# Patient Record
Sex: MALE | Race: WHITE | NOT HISPANIC OR LATINO | Employment: OTHER | URBAN - METROPOLITAN AREA
[De-identification: names, ages, dates, MRNs, and addresses within clinical notes are randomized per-mention and may not be internally consistent; named-entity substitution may affect disease eponyms.]

---

## 2019-01-22 ENCOUNTER — HOSPITAL ENCOUNTER (OUTPATIENT)
Dept: PREADMISSION TESTING | Facility: HOSPITAL | Age: 64
Discharge: HOME | End: 2019-01-22
Attending: SURGERY | Admitting: SURGERY

## 2019-01-22 LAB
ANION GAP SERPL CALC-SCNC: 12.8 MMOL/L (ref 10–20)
BACTERIA SPEC AEROBE CULT: NORMAL
BASOPHILS # BLD AUTO: 0.1 10*3/UL (ref 0–0.2)
BASOPHILS NFR BLD AUTO: 1 % (ref 0–2)
BUN SERPL-MCNC: 11 MG/DL (ref 8–20)
BUN/CREAT SERPL: 11 (ref 6.2–20.3)
CALCIUM SERPL-MCNC: 9 MG/DL (ref 8.9–10.3)
CHLORIDE SERPL-SCNC: 103 MMOL/L (ref 101–111)
CONV CO2: 22 MMOL/L (ref 22–32)
CREAT UR-MCNC: 1 MG/DL (ref 0.7–1.2)
DIFFERENTIAL METHOD BLD: (no result)
EOSINOPHIL # BLD AUTO: 0.1 10*3/UL (ref 0–0.3)
EOSINOPHIL # BLD AUTO: 1 % (ref 0–3)
ERYTHROCYTE [DISTWIDTH] IN BLOOD BY AUTOMATED COUNT: 13.9 % (ref 11.5–14.5)
GLUCOSE SERPL-MCNC: 116 MG/DL (ref 65–99)
HCT VFR BLD AUTO: 45.5 % (ref 40–54)
HGB BLD-MCNC: 15.3 G/DL (ref 14–18)
LYMPHOCYTES # BLD AUTO: 1.9 10*3/UL (ref 0.8–4.8)
LYMPHOCYTES NFR BLD AUTO: 23 % (ref 18–42)
Lab: NORMAL
MCH RBC QN AUTO: 31.3 PG (ref 26–32)
MCHC RBC AUTO-ENTMCNC: 33.7 G/DL (ref 32–36)
MCV RBC AUTO: 92.9 FL (ref 80–94)
MICRO REPORT STATUS: NORMAL
MONOCYTES # BLD AUTO: 0.5 10*3/UL (ref 0.1–1.3)
MONOCYTES NFR BLD AUTO: 7 % (ref 2–11)
NEUTROPHILS # BLD AUTO: 5.6 10*3/UL (ref 2.3–8.6)
NEUTROPHILS NFR BLD AUTO: 68 % (ref 50–75)
NRBC BLD AUTO-RTO: 0 /100{WBCS}
NRBC/RBC NFR BLD MANUAL: 0 10*3/UL
PLATELET # BLD AUTO: 341 10*3/UL (ref 150–450)
PMV BLD AUTO: 8 FL (ref 7.4–10.4)
POTASSIUM SERPL-SCNC: 3.8 MMOL/L (ref 3.6–5.1)
RBC # BLD AUTO: 4.9 10*6/UL (ref 4.6–6)
SODIUM SERPL-SCNC: 134 MMOL/L (ref 136–144)
SPECIMEN SOURCE: NORMAL
WBC # BLD AUTO: 8.1 10*3/UL (ref 4.5–11.5)

## 2019-01-30 ENCOUNTER — HOSPITAL ENCOUNTER (OUTPATIENT)
Dept: PREOP | Facility: HOSPITAL | Age: 64
Setting detail: HOSPITAL OUTPATIENT SURGERY
Discharge: HOME | End: 2019-01-30
Attending: SURGERY | Admitting: SURGERY

## 2022-11-29 ENCOUNTER — OFFICE VISIT (OUTPATIENT)
Dept: FAMILY MEDICINE CLINIC | Facility: CLINIC | Age: 67
End: 2022-11-29

## 2022-11-29 VITALS
RESPIRATION RATE: 16 BRPM | DIASTOLIC BLOOD PRESSURE: 60 MMHG | OXYGEN SATURATION: 97 % | HEART RATE: 73 BPM | BODY MASS INDEX: 22.35 KG/M2 | HEIGHT: 72 IN | WEIGHT: 165 LBS | TEMPERATURE: 97.9 F | SYSTOLIC BLOOD PRESSURE: 124 MMHG

## 2022-11-29 DIAGNOSIS — Z13.220 SCREENING CHOLESTEROL LEVEL: ICD-10-CM

## 2022-11-29 DIAGNOSIS — Z00.00 ANNUAL PHYSICAL EXAM: Primary | ICD-10-CM

## 2022-11-29 DIAGNOSIS — Z13.1 DIABETES MELLITUS SCREENING: ICD-10-CM

## 2022-11-29 DIAGNOSIS — F17.219 CIGARETTE NICOTINE DEPENDENCE WITH NICOTINE-INDUCED DISORDER: ICD-10-CM

## 2022-11-29 DIAGNOSIS — R63.6 UNDERWEIGHT: ICD-10-CM

## 2022-11-29 DIAGNOSIS — R53.83 OTHER FATIGUE: ICD-10-CM

## 2022-11-29 DIAGNOSIS — Z71.6 ENCOUNTER FOR SMOKING CESSATION COUNSELING: ICD-10-CM

## 2022-11-29 DIAGNOSIS — R79.9 ABNORMAL FINDING OF BLOOD CHEMISTRY, UNSPECIFIED: ICD-10-CM

## 2022-11-29 DIAGNOSIS — Z11.59 ENCOUNTER FOR HEPATITIS C SCREENING TEST FOR LOW RISK PATIENT: ICD-10-CM

## 2022-11-29 PROBLEM — K40.30 INCARCERATED INGUINAL HERNIA: Status: ACTIVE | Noted: 2019-01-10

## 2022-11-29 PROBLEM — K40.30 INCARCERATED INGUINAL HERNIA: Status: RESOLVED | Noted: 2019-01-10 | Resolved: 2022-11-29

## 2022-11-29 PROCEDURE — 99387 INIT PM E/M NEW PAT 65+ YRS: CPT | Performed by: STUDENT IN AN ORGANIZED HEALTH CARE EDUCATION/TRAINING PROGRAM

## 2022-11-29 PROCEDURE — 99203 OFFICE O/P NEW LOW 30 MIN: CPT | Performed by: STUDENT IN AN ORGANIZED HEALTH CARE EDUCATION/TRAINING PROGRAM

## 2022-11-29 RX ORDER — IBUPROFEN 800 MG/1
TABLET ORAL
COMMUNITY
Start: 2022-09-07 | End: 2023-03-02

## 2022-11-29 RX ORDER — HYDROCODONE BITARTRATE AND ACETAMINOPHEN 5; 325 MG/1; MG/1
TABLET ORAL
COMMUNITY
Start: 2022-11-04 | End: 2022-11-29

## 2022-11-29 RX ORDER — AMOXICILLIN 500 MG/1
TABLET, FILM COATED ORAL
COMMUNITY
Start: 2022-11-04 | End: 2022-11-29

## 2022-11-29 NOTE — PROGRESS NOTES
Chief Complaint   Patient presents with   • Establish Care     Pt transferring from Dr. Casey Zamarripa   • Annual Exam     Subjective   Hamlet Ortiz is a 67 y.o. male here for his annual physical with me.   Hamlet is here for coordination of medical care, to discuss health maintenance, disease prevention as well as to followup on medical problems.     Annual Physical Exam  Patient's last PE was 15 years ago.   -Patient's medical, surgical, family, substance, sexual, socioeconomic histories were reviewed and updated.  Patient's allergy and medication lists were reviewed and updated  Activity level is moderate.   Exercises 7 per week.  Walking daily  Appetite is good.   Feels well with few complaints.   Energy level is good.   Sleeps fairly well.   Patient's last colonoscopy was 2010. He is advised to repeat in 10 years.   Patient is doing routine self skin exam occasionally. Patient is doing routine testicle exams monthly.    Squamous Cell Carcinoma  -Patient had all of his teeth removed by dentist (was on amoxicillin, ibuprofen and hydrocodone for this.  He is no longer taking these medications).  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. Friday he has a consultation at [facility] in [place]  - Dr Salazar (ENT)  thinks they may be able to surgically remove the cancer alone    Smoking Cessation  -Patient has been smoking 1 pack a day since about 1992 (30 years) and he also did tobacco chew and quit in the 1980s  -Since the above diagnosis, patient has been cutting back a lot but is having trouble completely stopping  -Nicotine lozenges help but it takes about 2 to 3 hours to fully kick in  -Cravings get worse with anxiety (has never had to deal with anxiety  before)  -Would like to address via nicotine patches for now and revisit anxiety next time    Hamlet Ortiz  reports that he has been smoking cigarettes. He started smoking about 30 years ago. He has a 30.00 pack-year smoking history. He quit smokeless tobacco use about 42 years ago.  His smokeless tobacco use included chew.. I have educated him on the risk of diseases from using tobacco products such as cancer, COPD and heart disease.     I advised him to quit and he is willing to quit. We have discussed the following method/s for tobacco cessation:  OTC Cessation Products.  Together we have set a quit date for 1-2 months.  He will follow up with me in 1 month or sooner to check on his progress.    I spent 6 minutes counseling the patient.           Preventative  - has a FOBT kit but hasn't sent it off yet. Last colonoscopy was about 12 to 13 years ago  -Has had 5 COVID vaccinations but does not have his card on him today      The following portions of the patient's history were reviewed and updated as appropriate: allergies, current medications, past family history, past medical history, past social history, past surgical history and problem list.      Past Medical History :  Active Ambulatory Problems     Diagnosis Date Noted   • Underweight 11/29/2022   • Cigarette nicotine dependence with nicotine-induced disorder 11/29/2022     Resolved Ambulatory Problems     Diagnosis Date Noted   • Incarcerated inguinal hernia 01/10/2019     No Additional Past Medical History       Medication List:    Current Outpatient Medications:   •  ibuprofen (ADVIL,MOTRIN) 800 MG tablet, , Disp: , Rfl:   •  nicotine (NICODERM CQ) 7 MG/24HR patch, Place 1 patch on the skin as directed by provider Daily., Disp: 30 patch, Rfl: 2    Allergies:  No Known Allergies    Social History:  Social History     Socioeconomic History   • Marital status:    Tobacco Use   • Smoking status: Every Day     Packs/day: 1.00     Years: 30.00      "Pack years: 30.00     Types: Cigarettes     Start date: 1992   • Smokeless tobacco: Former     Types: Chew     Quit date: 1980   • Tobacco comments:     Smoking 3 cigarettes a day (11/29/22)   Vaping Use   • Vaping Use: Never used   Substance and Sexual Activity   • Alcohol use: Not Currently     Comment: recovering alcoholic. 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 (11/2022)   • Drug use: Never   • Sexual activity: Not Currently     Comment:        Family History:  Family History   Problem Relation Age of Onset   • Heart failure Mother    • Kidney cancer Father    • Lung cancer Paternal Grandmother        Past Surgical History:  Past Surgical History:   Procedure Laterality Date   • HERNIA REPAIR      2018   • MULTIPLE TOOTH EXTRACTIONS      all teeth removed 11/2022   • SHOULDER SURGERY Left     arthroscopic done 2015       PHQ-2 Depression Screening  Little interest or pleasure in doing things?     Feeling down, depressed, or hopeless?     PHQ-2 Total Score       Health Maintenance   Topic Date Due   • COLORECTAL CANCER SCREENING  Never done   • TDAP/TD VACCINES (1 - Tdap) Never done   • ZOSTER VACCINE (1 of 2) Never done   • Pneumococcal Vaccine 65+ (1 - PCV) Never done   • COVID-19 Vaccine (1) 09/26/2022   • HEPATITIS C SCREENING  Never done   • ANNUAL WELLNESS VISIT  Never done   • AAA SCREEN (ONE-TIME)  Never done   • INFLUENZA VACCINE  Completed         There is no immunization history on file for this patient.        Physical Exam:  Vital Signs:  /60 (BP Location: Right arm, Patient Position: Sitting, Cuff Size: Adult)   Pulse 73   Temp 97.9 °F (36.6 °C) (Skin)   Resp 16   Ht 182.9 cm (72\")   Wt 74.8 kg (165 lb)   SpO2 97%   BMI 22.38 kg/m²     Physical Exam  Constitutional:       General: He is not in acute distress.     Appearance: Normal appearance.   HENT:      Head: Normocephalic and atraumatic.      Right Ear: Tympanic membrane normal.      Left Ear: Tympanic membrane normal.      Nose: Nose " normal.      Mouth/Throat:      Mouth: Mucous membranes are moist.      Pharynx: Oropharynx is clear. No posterior oropharyngeal erythema.      Comments: - There were 2 white lesions on the floor of patient's mouth  Eyes:      Extraocular Movements: Extraocular movements intact.      Conjunctiva/sclera: Conjunctivae normal.   Neck:      Comments: - Thyroid not enlarged  Cardiovascular:      Rate and Rhythm: Normal rate and regular rhythm.      Heart sounds: Normal heart sounds.   Pulmonary:      Effort: Pulmonary effort is normal.      Breath sounds: Normal breath sounds. No stridor. No wheezing.   Abdominal:      General: Abdomen is flat. Bowel sounds are normal.      Palpations: Abdomen is soft. There is no mass.      Tenderness: There is no abdominal tenderness. There is no guarding.   Musculoskeletal:         General: No swelling or deformity. Normal range of motion.      Cervical back: Normal range of motion and neck supple.   Skin:     General: Skin is warm and dry.      Capillary Refill: Capillary refill takes less than 2 seconds.      Coloration: Skin is not jaundiced.      Findings: No rash.   Neurological:      General: No focal deficit present.      Mental Status: He is alert and oriented to person, place, and time.      Cranial Nerves: No cranial nerve deficit.      Motor: No weakness.      Coordination: Coordination normal.      Gait: Gait normal.      Deep Tendon Reflexes: Reflexes normal.   Psychiatric:         Mood and Affect: Mood normal.         Behavior: Behavior normal.         Thought Content: Thought content normal.           Assessment and Plan:  Diagnoses and all orders for this visit:    1. Annual physical exam (Primary)  -Normal physical exam except for 2 white lesions on the floor patient's mouth that is already been addressed by dental and ENT    2. Cigarette nicotine dependence with nicotine-induced disorder  -     [place] aaa screen limited; Future patient agreeable to screen    3. Diabetes  mellitus screening  -     Hemoglobin A1c    4. Screening cholesterol level  -     Comprehensive Metabolic Panel to check LFTs  -     Lipid Panel With / Chol / HDL Ratio to screen for cholesterol    5. Encounter for hepatitis C screening test for low risk patient  -     Hepatitis C Antibody    6. Other fatigue  -     CBC w AUTO Differential    7. Encounter for smoking cessation counseling  -   Discussed options with patient and patient would like to do the nicotine patches.  Since he is already cut himself down to about 3 cigarettes/day, would like to do a lower dose patch  - nicotine (NICODERM CQ) 7 MG/24HR patch; Place 1 patch on the skin as directed by provider Daily.  Dispense: 30 patch; Refill: 2  -Follow-up in about 1 month to monitor progress    8. Abnormal finding of blood chemistry, unspecified  -     Hemoglobin A1c    9. Underweight  Assessment & Plan:  BMI is within normal parameters. No other follow-up for BMI required.      Follow-up  -Follow-up in about 1 month to monitor progress    Discussed screening for common diseases.  Discussed timing of cervical cancer screening with pap smear and HPV testing. Encouraged self-breast exams, clinical breast exams, and discussed timing of mammograms.  Recommend yearly eye exams. Also discussed monitoring of blood pressure, lipids, blood sugars.      Mammography ordered.  Colon cancer screening options discussed, including Colonoscopy, Cologuard and Hemoccult cards.  Occult blood testing negative in office today.  She was given additional information on the Cologuard testing and can contact the office if order desired.     I wore protective equipment throughout this patient encounter to include mask and eye protection. Hand hygiene was performed before donning protective equipment and after removal when leaving the room.

## 2022-11-29 NOTE — PATIENT INSTRUCTIONS
Please call your insurance about where to get your tetanus booster, shingles vaccine and pneumonia shot the cheapest

## 2022-12-15 ENCOUNTER — HOSPITAL ENCOUNTER (OUTPATIENT)
Dept: ULTRASOUND IMAGING | Facility: HOSPITAL | Age: 67
Discharge: HOME | End: 2022-12-15
Admitting: STUDENT IN AN ORGANIZED HEALTH CARE EDUCATION/TRAINING PROGRAM

## 2022-12-15 ENCOUNTER — TELEPHONE (OUTPATIENT)
Dept: FAMILY MEDICINE CLINIC | Facility: CLINIC | Age: 67
End: 2022-12-15

## 2022-12-15 DIAGNOSIS — F17.219 CIGARETTE NICOTINE DEPENDENCE WITH NICOTINE-INDUCED DISORDER: ICD-10-CM

## 2022-12-15 PROCEDURE — 76706 US ABDL AORTA SCREEN AAA: CPT

## 2022-12-15 NOTE — TELEPHONE ENCOUNTER
----- Message from Nicky Melton DO sent at 12/15/2022 11:35 AM EST -----  Please let patient know that he is negative for an abdominal aortic aneurysm per ultrasound

## 2023-01-12 ENCOUNTER — OFFICE VISIT (OUTPATIENT)
Dept: FAMILY MEDICINE CLINIC | Facility: CLINIC | Age: 68
End: 2023-01-12
Payer: MEDICARE

## 2023-01-12 VITALS
BODY MASS INDEX: 22.54 KG/M2 | HEIGHT: 72 IN | TEMPERATURE: 97.3 F | OXYGEN SATURATION: 97 % | WEIGHT: 166.4 LBS | RESPIRATION RATE: 18 BRPM | DIASTOLIC BLOOD PRESSURE: 60 MMHG | HEART RATE: 64 BPM | SYSTOLIC BLOOD PRESSURE: 130 MMHG

## 2023-01-12 DIAGNOSIS — Z12.2 SCREENING FOR LUNG CANCER: ICD-10-CM

## 2023-01-12 DIAGNOSIS — R63.6 UNDERWEIGHT: ICD-10-CM

## 2023-01-12 DIAGNOSIS — Z87.891 DISCONTINUED SMOKING: Primary | ICD-10-CM

## 2023-01-12 DIAGNOSIS — Z87.891 PERSONAL HISTORY OF NICOTINE DEPENDENCE: ICD-10-CM

## 2023-01-12 PROBLEM — C06.0 SQUAMOUS CELL CANCER OF BUCCAL MUCOSA: Status: ACTIVE | Noted: 2023-01-12

## 2023-01-12 PROCEDURE — 99213 OFFICE O/P EST LOW 20 MIN: CPT | Performed by: STUDENT IN AN ORGANIZED HEALTH CARE EDUCATION/TRAINING PROGRAM

## 2023-01-12 RX ORDER — OXYCODONE HCL 5 MG/5 ML
SOLUTION, ORAL ORAL
COMMUNITY
Start: 2022-12-31 | End: 2023-03-02

## 2023-01-12 RX ORDER — CHLORHEXIDINE GLUCONATE 0.12 MG/ML
RINSE ORAL
COMMUNITY
Start: 2023-01-04 | End: 2023-03-02

## 2023-01-12 RX ORDER — POLYETHYLENE GLYCOL 3350 17 G/17G
POWDER, FOR SOLUTION ORAL
COMMUNITY
Start: 2022-12-31 | End: 2023-03-02

## 2023-01-12 NOTE — PROGRESS NOTES
"Subjective   Hamlte Ortiz is a 67 y.o. male.   Chief Complaint   Patient presents with   • Nicotine Dependence     Follow up from 11/29/2022       History of Present Illness       Smoking sensation:  -Follow up from 11/29/2022  -Medication: None  -Pt states he quit smoking 12/05/2022  - patches work \"perfect\" denies any cravings. Used to have a lot of trouble with it when his anxiety was high, even with squamaous cell cancer has no thought of going back  - denies needing more patches  -Discussed that patient needs a chest CT to screen for potential lung cancer due to smoking history.  Patient verbalized understanding and agreeable      Oral Squamous Cell Carcinoma  - oral mucosal lesion sampled and found to be squamous cell via dentist  - Seeing [facility] in [place]  - pt has had surgical removal of superficial invasive squamous cell carcinoma with lymph node dissection in neck.   - All tested lymph nodes negative for carcinoma (done about 12/30/22). All margins of specimen negative for invasive tumor  -We will see surgeon again on Tuesday for follow-up  -Patient brought path report from surgeons office.  We will scan into his chart        Preventative  - still hasn't sent in his FOBT kit. Is dealing with oral squamous cell carcinoma and says he \"can only deal with so much bad news at a time\"    The following portions of the patient's history were reviewed and updated as appropriate: allergies, current medications, past family history, past medical history, past social history, past surgical history and problem list.    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 30 patch 2     No current facility-administered medications on file prior to visit.     Current outpatient and discharge medications have been reconciled for the patient.  Reviewed by: Nicky Melton DO      No Known Allergies      Objective   Visit Vitals  /60 (BP Location: Right arm, Patient Position: Sitting, Cuff Size: Adult)   Pulse 64   Temp 97.3 °F (36.3 °C) (Skin)   Resp 18   Ht 182.9 cm (72\")   Wt 75.5 kg (166 lb 6.4 oz)   SpO2 97%   BMI 22.57 kg/m²       Physical Exam  HENT:      Head: Normocephalic.      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   Cardiovascular:      Rate and Rhythm: Normal rate and regular rhythm.      Heart sounds: Normal heart sounds.   Pulmonary:      Effort: Pulmonary effort is normal. No respiratory distress.      Breath sounds: Normal breath sounds. No wheezing, rhonchi or rales.   Neurological:      Mental Status: He is alert.           Diagnoses and all orders for this visit:    1. Discontinued smoking (Primary)  - pt has successfully quit smoking with the nicotine patches (7g), states he has extra at home and doesn't need anymore.  - patches worked very well for pt, \"hasn't even thought about them\" since quitting  - asked pt to reach out if he starts feeling like he needs more support to quit  - told pt there may be a smokers support group and to contact me if he feels this would be helpful    2. Screening for lung cancer/Personal history of nicotine dependence  -      CT Chest Low Dose Cancer Screening WO; Future    4. Underweight  Assessment & " Plan:  Body mass index is 22.57 kg/m².          Follow Up  - 1-2 months for annual physical    Expected course, medications, and adverse effects discussed as appropriate.  Call or return if worsening or persistent symptoms.  I wore protective equipment throughout this patient encounter to include mask and eye protection. Hand hygiene was performed before donning protective equipment and after removal when leaving the room.    This document is intended for medical expert use only. Reading of this document by patients and/or patient's family without participating medical staff guidance may result in misinterpretation and unintended morbidity. Any interpretation of such data is the responsibility of the patient and/or family member responsible for the patient in concert with their primary or specialist providers, not to be left for sources of online searches such as [employer], [employer] or similar queries. Relying on these approaches to knowledge may result in misinterpretation, misguided goals of care and even death should patients or family members try recommendations outside of the realm of professional medical care.

## 2023-01-13 LAB
ALBUMIN SERPL-MCNC: 4.6 G/DL (ref 3.8–4.8)
ALBUMIN/GLOB SERPL: 1.9 {RATIO} (ref 1.2–2.2)
ALP SERPL-CCNC: 78 IU/L (ref 44–121)
ALT SERPL-CCNC: 25 IU/L (ref 0–44)
AST SERPL-CCNC: 22 IU/L (ref 0–40)
BASOPHILS # BLD AUTO: 0.1 X10E3/UL (ref 0–0.2)
BASOPHILS NFR BLD AUTO: 1 %
BILIRUB SERPL-MCNC: 0.2 MG/DL (ref 0–1.2)
BUN SERPL-MCNC: 23 MG/DL (ref 8–27)
BUN/CREAT SERPL: 26 (ref 10–24)
CALCIUM SERPL-MCNC: 9.8 MG/DL (ref 8.6–10.2)
CHLORIDE SERPL-SCNC: 99 MMOL/L (ref 96–106)
CHOLEST SERPL-MCNC: 165 MG/DL (ref 100–199)
CHOLEST/HDLC SERPL: 4.7 RATIO (ref 0–5)
CO2 SERPL-SCNC: 24 MMOL/L (ref 20–29)
CREAT SERPL-MCNC: 0.87 MG/DL (ref 0.76–1.27)
EGFRCR SERPLBLD CKD-EPI 2021: 93 ML/MIN/1.73
EOSINOPHIL # BLD AUTO: 0.1 X10E3/UL (ref 0–0.4)
EOSINOPHIL NFR BLD AUTO: 1 %
ERYTHROCYTE [DISTWIDTH] IN BLOOD BY AUTOMATED COUNT: 13 % (ref 11.6–15.4)
GLOBULIN SER CALC-MCNC: 2.4 G/DL (ref 1.5–4.5)
GLUCOSE SERPL-MCNC: 108 MG/DL (ref 70–99)
HBA1C MFR BLD: 5.9 % (ref 4.8–5.6)
HCT VFR BLD AUTO: 41.7 % (ref 37.5–51)
HCV AB S/CO SERPL IA: <0.1 S/CO RATIO (ref 0–0.9)
HDLC SERPL-MCNC: 35 MG/DL
HGB BLD-MCNC: 14.1 G/DL (ref 13–17.7)
IMM GRANULOCYTES # BLD AUTO: 0.1 X10E3/UL (ref 0–0.1)
IMM GRANULOCYTES NFR BLD AUTO: 1 %
LDLC SERPL CALC-MCNC: 106 MG/DL (ref 0–99)
LYMPHOCYTES # BLD AUTO: 1.6 X10E3/UL (ref 0.7–3.1)
LYMPHOCYTES NFR BLD AUTO: 20 %
MCH RBC QN AUTO: 30.7 PG (ref 26.6–33)
MCHC RBC AUTO-ENTMCNC: 33.8 G/DL (ref 31.5–35.7)
MCV RBC AUTO: 91 FL (ref 79–97)
MONOCYTES # BLD AUTO: 0.8 X10E3/UL (ref 0.1–0.9)
MONOCYTES NFR BLD AUTO: 9 %
NEUTROPHILS # BLD AUTO: 5.7 X10E3/UL (ref 1.4–7)
NEUTROPHILS NFR BLD AUTO: 68 %
PLATELET # BLD AUTO: 469 X10E3/UL (ref 150–450)
POTASSIUM SERPL-SCNC: 5.3 MMOL/L (ref 3.5–5.2)
PROT SERPL-MCNC: 7 G/DL (ref 6–8.5)
RBC # BLD AUTO: 4.59 X10E6/UL (ref 4.14–5.8)
SODIUM SERPL-SCNC: 138 MMOL/L (ref 134–144)
TRIGL SERPL-MCNC: 133 MG/DL (ref 0–149)
VLDLC SERPL CALC-MCNC: 24 MG/DL (ref 5–40)
WBC # BLD AUTO: 8.3 X10E3/UL (ref 3.4–10.8)

## 2023-01-16 PROBLEM — R73.03 PREDIABETES: Status: ACTIVE | Noted: 2023-01-16

## 2023-02-02 ENCOUNTER — HOSPITAL ENCOUNTER (OUTPATIENT)
Dept: CT IMAGING | Facility: HOSPITAL | Age: 68
Discharge: HOME | End: 2023-02-02
Admitting: STUDENT IN AN ORGANIZED HEALTH CARE EDUCATION/TRAINING PROGRAM
Payer: MEDICARE

## 2023-02-02 DIAGNOSIS — Z12.2 SCREENING FOR LUNG CANCER: ICD-10-CM

## 2023-02-02 DIAGNOSIS — Z87.891 PERSONAL HISTORY OF NICOTINE DEPENDENCE: ICD-10-CM

## 2023-02-02 PROCEDURE — 71271 CT THORAX LUNG CANCER SCR C-: CPT

## 2023-02-06 ENCOUNTER — DOCUMENTATION (OUTPATIENT)
Dept: FAMILY MEDICINE CLINIC | Facility: CLINIC | Age: 68
End: 2023-02-06
Payer: MEDICARE

## 2023-03-02 ENCOUNTER — OFFICE VISIT (OUTPATIENT)
Dept: FAMILY MEDICINE CLINIC | Facility: CLINIC | Age: 68
End: 2023-03-02
Payer: MEDICARE

## 2023-03-02 VITALS
RESPIRATION RATE: 16 BRPM | TEMPERATURE: 97.3 F | HEIGHT: 72 IN | WEIGHT: 168 LBS | OXYGEN SATURATION: 97 % | BODY MASS INDEX: 22.75 KG/M2 | DIASTOLIC BLOOD PRESSURE: 68 MMHG | SYSTOLIC BLOOD PRESSURE: 112 MMHG | HEART RATE: 67 BPM

## 2023-03-02 DIAGNOSIS — R73.03 PREDIABETES: ICD-10-CM

## 2023-03-02 DIAGNOSIS — Z00.00 MEDICARE ANNUAL WELLNESS VISIT, SUBSEQUENT: Primary | ICD-10-CM

## 2023-03-02 DIAGNOSIS — R63.6 UNDERWEIGHT: ICD-10-CM

## 2023-03-02 PROBLEM — Z87.898 HISTORY OF PREDIABETES: Status: ACTIVE | Noted: 2023-03-02

## 2023-03-02 LAB
EXPIRATION DATE: NORMAL
HBA1C MFR BLD: 5.5 %
Lab: NORMAL

## 2023-03-02 PROCEDURE — 99213 OFFICE O/P EST LOW 20 MIN: CPT | Performed by: STUDENT IN AN ORGANIZED HEALTH CARE EDUCATION/TRAINING PROGRAM

## 2023-03-02 PROCEDURE — 3044F HG A1C LEVEL LT 7.0%: CPT | Performed by: STUDENT IN AN ORGANIZED HEALTH CARE EDUCATION/TRAINING PROGRAM

## 2023-03-02 PROCEDURE — 83036 HEMOGLOBIN GLYCOSYLATED A1C: CPT | Performed by: STUDENT IN AN ORGANIZED HEALTH CARE EDUCATION/TRAINING PROGRAM

## 2023-03-02 PROCEDURE — 1159F MED LIST DOCD IN RCRD: CPT | Performed by: STUDENT IN AN ORGANIZED HEALTH CARE EDUCATION/TRAINING PROGRAM

## 2023-03-02 PROCEDURE — G0439 PPPS, SUBSEQ VISIT: HCPCS | Performed by: STUDENT IN AN ORGANIZED HEALTH CARE EDUCATION/TRAINING PROGRAM

## 2023-03-02 NOTE — PROGRESS NOTES
The ABCs of the Annual Wellness Visit  Subsequent Medicare Wellness Visit    Subjective    Hamlet Ortiz is a 67 y.o. male who presents for a Subsequent Medicare Wellness Visit.    The following portions of the patient's history were reviewed and   updated as appropriate: allergies, current medications, past family history, past medical history, past social history, past surgical history and problem list.    Compared to one year ago, the patient feels his physical   health is worse.  -Patient was diagnosed with oral squamous cell cancer.  He is seeing oncology for treatment but he has no teeth at least until about June    Compared to one year ago, the patient feels his mental   health is the same.    Recent Hospitalizations:  He was admitted within the past 365 days at [facility].       Current Medical Providers:  Patient Care Team:  Nicky Melton DO as PCP - General (Family Medicine)  Carol Salazar MD (Otolaryngology)    Outpatient Medications Prior to Visit   Medication Sig Dispense Refill   • chlorhexidine (PERIDEX) 0.12 % solution RINSE AND GARGLE 15 ML BY MOUTH OR THROAT THREE TIMES DAILY WITH MEALS     • ibuprofen (ADVIL,MOTRIN) 800 MG tablet      • oxyCODONE (ROXICODONE) 5 MG/5ML solution TAKE 5 ML BY MOUTH EVERY 6 HOURS AS NEEDED FOR PAIN     • polyethylene glycol (MIRALAX) 17 GM/SCOOP powder        No facility-administered medications prior to visit.       No opioid medication identified on active medication list. I have reviewed chart for other potential  high risk medication/s and harmful drug interactions in the elderly.          Aspirin is not on active medication list.  Aspirin use is indicated based on review of current medical condition/s. Pros and cons of this therapy have been discussed with this patient. Benefits of this medication outweigh potential harm.  Patient has been instructed to start taking this medication..    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  ACP discussion was held with the patient during this visit. Patient does not have an advance directive, information provided..  Spent about 10 minutes in discussion of ACP with patient     Objective    Vitals:    23 0926   BP: 112/68   BP Location: Right arm   Patient Position: Sitting   Cuff Size: Adult   Pulse: 67   Resp: 16   Temp: 97.3 °F (36.3 °C)   TempSrc: Skin   SpO2: 97%   Weight: 76.2 kg (168 lb)   Height: 182.9 cm (72\")     Estimated body mass index is 22.78 kg/m² as calculated from the following:    Height as of this encounter: 182.9 cm (72\").    Weight as of this encounter: 76.2 kg (168 lb).    BMI is within normal parameters. No other follow-up for BMI required.      Does the patient have evidence of cognitive impairment? No    Lab Results   Component Value Date    CHLPL 165 2023    TRIG 133 2023    HDL 35 (L) 2023     (H) 2023    VLDL 24 2023    HGBA1C 5.5 2023        HEALTH RISK ASSESSMENT    Smoking Status:  Social History     Tobacco Use   Smoking Status Former   • Packs/day: 1.00   • Years: 30.00   • Pack years: 30.00   • Types: Cigarettes   • Start date: 1992   • Quit date: 2022   • Years since quittin.2   • Passive exposure: Past   Smokeless Tobacco Former   • Types: Chew   • Quit date:      Alcohol Consumption:  Social History     Substance and Sexual Activity   Alcohol Use Not Currently    Comment: recovering alcoholic. 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 (2022)     Fall Risk Screen:    STEADI Fall Risk Assessment was completed, and patient is at MODERATE risk for falls. Assessment completed on:3/2/2023    Depression Screening:  PHQ-2/PHQ-9 Depression Screening 3/2/2023   Little Interest or Pleasure in Doing Things 0-->not at all   Feeling Down, " Depressed or Hopeless 0-->not at all   Trouble Falling or Staying Asleep, or Sleeping Too Much 0-->not at all   Feeling Tired or Having Little Energy 0-->not at all   Poor Appetite or Overeating 0-->not at all   Feeling Bad about Yourself - or that You are a Failure or Have Let Yourself or Your Family Down 0-->not at all   Trouble Concentrating on Things, Such as Reading the Newspaper or Watching Television 0-->not at all   Moving or Speaking So Slowly that Other People Could Have Noticed? Or the Opposite - Being So Fidgety 0-->not at all   Thoughts that You Would be Better Off Dead or of Hurting Yourself in Some Way 0-->not at all   PHQ-9: Brief Depression Severity Measure Score 0   If You Checked Off Any Problems, How Difficult Have These Problems Made It For You to Do Your Work, Take Care of Things at Home, or Get Along with Other People? not difficult at all       Health Habits and Functional and Cognitive Screening:  Functional & Cognitive Status 3/2/2023   Do you have difficulty preparing food and eating? No   Do you have difficulty bathing yourself, getting dressed or grooming yourself? No   Do you have difficulty using the toilet? No   Do you have difficulty moving around from place to place? No   Do you have trouble with steps or getting out of a bed or a chair? No   Current Diet Well Balanced Diet   Dental Exam Up to date        Dental Exam Comment Dr. Cha   Eye Exam Not up to date        Eye Exam Comment [facility]   Exercise (times per week) 7 times per week   Current Exercises Include Walking;Yard Work        Exercise Comment walking 3 miles daily   Do you need help using the phone?  No   Are you deaf or do you have serious difficulty hearing?  Yes   Do you need help with transportation? No   Do you need help shopping? No   Do you need help preparing meals?  No   Do you need help with housework?  No   Do you need help with laundry? No   Do you need help taking your medications? No   Do you need  help managing money? No   Do you ever drive or ride in a car without wearing a seat belt? No   Have you felt unusual stress, anger or loneliness in the last month? No   Who do you live with? Alone   If you need help, do you have trouble finding someone available to you? No   Have you been bothered in the last four weeks by sexual problems? No   Do you have difficulty concentrating, remembering or making decisions? No       Age-appropriate Screening Schedule:  Refer to the list below for future screening recommendations based on patient's age, sex and/or medical conditions. Orders for these recommended tests are listed in the plan section. The patient has been provided with a written plan.    Health Maintenance   Topic Date Due   • COLORECTAL CANCER SCREENING  Never done   • TDAP/TD VACCINES (1 - Tdap) 03/13/2023 (Originally 5/3/1974)   • ZOSTER VACCINE (1 of 2) 03/13/2023 (Originally 5/3/2003)   • Pneumococcal Vaccine 65+ (1 - PCV) 03/18/2023 (Originally 5/3/2018)   • LUNG CANCER SCREENING  02/02/2024   • ANNUAL WELLNESS VISIT  03/02/2024   • HEPATITIS C SCREENING  Completed   • COVID-19 Vaccine  Completed   • INFLUENZA VACCINE  Completed   • AAA SCREEN (ONE-TIME)  Completed                CMS Preventative Services Quick Reference  Risk Factors Identified During Encounter  None Identified  The above risks/problems have been discussed with the patient.  Pertinent information has been shared with the patient in the After Visit Summary.  An After Visit Summary and PPPS were made available to the patient.    Assessment and Plan   Diagnoses and all orders for this visit:    1. Medicare annual wellness visit, subsequent (Primary)  -ACP was provided to patient.  He will take this home, fill it out and drop it off at the  whenever he is finished with it  - all pertinent lab work has already been drawn and resulted  -Green packet was provided to patient for colonoscopy ([facility]).  Told him to mail  this in, give it about 5 business days and then call their office if he does not hear back    2. Prediabetes  -     POC Glycosylated Hemoglobin (Hb A1C): 5.5%  -Discussed with patient that this is not even in the prediabetic range.  We will keep an annual eye on this    3. Underweight  Assessment & Plan:  Body mass index is 22.78 kg/m².            Follow Up  -1 year for annual wellness exam    Patient was given instructions and counseling regarding his condition or for health maintenance advice. Please see specific information pulled into the AVS if appropriate.

## 2023-03-02 NOTE — PROGRESS NOTES
Prediabetes  -Patient's A1c on 1/12 was 5.9%.  Wanted to recheck his A1c to make sure he is not continuously going up  -Patient has oral squamous cell cancer.  He has no teeth and some of his mandible is exposed in his mouth.    - He sees oncology and was told that he might possibly get teeth in June  -States it is very difficult to eat well because all of the soft and easier to eat foods are full of sugar, fats and carbs  -Patient been trying very hard to eat healthy despite this but knows he is eating more sugar than he should (and wants)  -Point-of-care A1c today was 5.5%, patient is no longer in the prediabetic category    Preventative  -Patient states his last colonoscopy was about 15 years ago.  He knows he is past due  -Patient agreeable to colonoscopy  -Patient was given a green packet for [facility]

## 2023-08-14 ENCOUNTER — TELEPHONE (OUTPATIENT)
Dept: FAMILY MEDICINE CLINIC | Facility: CLINIC | Age: 68
End: 2023-08-14
Payer: MEDICARE

## 2023-08-14 DIAGNOSIS — I71.21 ANEURYSM OF ASCENDING AORTA WITHOUT RUPTURE: Primary | ICD-10-CM

## 2023-08-14 NOTE — TELEPHONE ENCOUNTER
Chest CT on 1/12/2023 found a 4.3 cm aneurysmal dilation of the ascending thoracic aorta.  Rechecking this 6 months later to ensure stability

## 2023-09-05 ENCOUNTER — HOSPITAL ENCOUNTER (OUTPATIENT)
Dept: CT IMAGING | Facility: HOSPITAL | Age: 68
Discharge: HOME | End: 2023-09-05
Admitting: STUDENT IN AN ORGANIZED HEALTH CARE EDUCATION/TRAINING PROGRAM
Payer: MEDICARE

## 2023-09-05 DIAGNOSIS — I71.21 ANEURYSM OF ASCENDING AORTA WITHOUT RUPTURE: ICD-10-CM

## 2023-09-05 PROCEDURE — 71250 CT THORAX DX C-: CPT

## 2023-09-11 ENCOUNTER — TELEPHONE (OUTPATIENT)
Dept: FAMILY MEDICINE CLINIC | Facility: CLINIC | Age: 68
End: 2023-09-11
Payer: MEDICARE

## 2023-09-11 DIAGNOSIS — I71.21 ANEURYSM OF ASCENDING AORTA WITHOUT RUPTURE: ICD-10-CM

## 2023-09-11 DIAGNOSIS — I25.10 CORONARY ARTERY DISEASE INVOLVING NATIVE CORONARY ARTERY OF NATIVE HEART WITHOUT ANGINA PECTORIS: Primary | ICD-10-CM

## 2023-09-11 RX ORDER — ROSUVASTATIN CALCIUM 20 MG/1
20 TABLET, COATED ORAL DAILY
Qty: 90 TABLET | Refills: 3 | Status: SHIPPED | OUTPATIENT
Start: 2023-09-11

## 2023-09-12 NOTE — TELEPHONE ENCOUNTER
Patient sent [employer] message requesting cardiology referral and statin.  These orders have been placed and will request patient to contact us should he notice any muscle weakness, pain or fatigue since during statin

## 2023-09-25 PROBLEM — C06.9 SQUAMOUS CELL CARCINOMA OF MOUTH: Status: ACTIVE | Noted: 2023-01-04

## 2023-09-25 PROBLEM — Z92.29 COVID-19 VACCINE SERIES COMPLETED: Status: ACTIVE | Noted: 2023-09-25

## 2023-09-25 PROBLEM — C06.9 ORAL CANCER: Status: ACTIVE | Noted: 2023-09-25

## 2023-09-25 RX ORDER — IBUPROFEN 800 MG/1
800 TABLET ORAL EVERY 8 HOURS PRN
COMMUNITY
Start: 2023-08-30 | End: 2023-09-26

## 2023-09-25 RX ORDER — HYDROCODONE BITARTRATE AND ACETAMINOPHEN 5; 325 MG/1; MG/1
TABLET ORAL SEE ADMIN INSTRUCTIONS
COMMUNITY
Start: 2023-08-30 | End: 2023-09-26

## 2023-09-26 ENCOUNTER — OFFICE VISIT (OUTPATIENT)
Dept: CARDIOLOGY | Facility: CLINIC | Age: 68
End: 2023-09-26
Payer: MEDICARE

## 2023-09-26 VITALS
HEART RATE: 80 BPM | BODY MASS INDEX: 23.35 KG/M2 | HEIGHT: 72 IN | WEIGHT: 172.4 LBS | OXYGEN SATURATION: 96 % | DIASTOLIC BLOOD PRESSURE: 81 MMHG | RESPIRATION RATE: 18 BRPM | SYSTOLIC BLOOD PRESSURE: 162 MMHG

## 2023-09-26 DIAGNOSIS — Z72.0 TOBACCO USE: ICD-10-CM

## 2023-09-26 DIAGNOSIS — I25.84 CORONARY ARTERY CALCIFICATION: Primary | ICD-10-CM

## 2023-09-26 DIAGNOSIS — I71.21 ANEURYSM OF ASCENDING AORTA WITHOUT RUPTURE: ICD-10-CM

## 2023-09-26 DIAGNOSIS — R03.0 ELEVATED BLOOD PRESSURE READING: ICD-10-CM

## 2023-09-26 DIAGNOSIS — I25.10 CORONARY ARTERY DISEASE INVOLVING NATIVE CORONARY ARTERY OF NATIVE HEART WITHOUT ANGINA PECTORIS: ICD-10-CM

## 2023-09-26 DIAGNOSIS — I25.10 CORONARY ARTERY CALCIFICATION: Primary | ICD-10-CM

## 2023-09-26 NOTE — PROGRESS NOTES
"      Subjective:     Encounter Date:09/26/2023      Patient ID: Hamlet Ortiz is a 68 y.o. male.    Chief Complaint:  Chief Complaint   Patient presents with    Establish Care     New pt.    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  Was incidentally found on CT scans done for oral cancer management.    Used to smoke 1 pack a day however has quit for now.  Also mentions exercising a lot in the past and running marathons.  Overall has no limitations in functional status from cardiac standpoint.      Objective:         /81   Pulse 80   Resp 18   Ht 182.9 cm (72\")   Wt 78.2 kg (172 lb 6.4 oz)   SpO2 96%   BMI 23.38 kg/m²     Physical exam  General-no acute distress  CVS-S1-S2 normal, no murmur  Respiratory-clear breath sounds  GI-soft nontender abdomen  No pedal edema  Alert oriented X3    ROS:  14 point review of systems negative except as mentioned above    Current Outpatient Medications:     rosuvastatin (CRESTOR) 20 MG tablet, Take 1 tablet by mouth Daily., Disp: 90 tablet, Rfl: 3    Problem List:  Patient Active Problem List   Diagnosis    Underweight    Cigarette nicotine dependence with nicotine-induced disorder    Squamous cell cancer of buccal mucosa    History of prediabetes    Coronary artery disease involving native coronary artery of native heart without angina pectoris    Aneurysm of ascending aorta without rupture    Squamous cell carcinoma of mouth    Oral cancer    COVID-19 vaccine series completed     Past Medical History:  Past Medical History:   Diagnosis Date    Aneurysm of ascending aorta     Cancer 11-    Squamous Cell carcinoma    Visual " impairment 2017    Macular degeneration     Past Surgical History:  Past Surgical History:   Procedure Laterality Date    HERNIA REPAIR      2018    LYMPH NODE BIOPSY  2022    MULTIPLE TOOTH EXTRACTIONS      all teeth removed 2022    SHOULDER SURGERY Left     arthroscopic done      Social History:  Social History     Socioeconomic History    Marital status:    Tobacco Use    Smoking status: Former     Packs/day: 1.00     Years: 30.00     Pack years: 30.00     Types: Cigarettes     Start date: 1992     Quit date: 2022     Years since quittin.8     Passive exposure: Past    Smokeless tobacco: Former     Types: Chew     Quit date:    Vaping Use    Vaping Use: Never used   Substance and Sexual Activity    Alcohol use: Not Currently     Comment: recovering alcoholic. 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 (2022)    Drug use: Never    Sexual activity: Not Currently     Comment:      Allergies:  No Known Allergies  Immunizations:  Immunization History   Administered Date(s) Administered    COVID-19 (MODERNA) 1st,2nd,3rd Dose Monovalent 2021, 2021, 10/28/2021, 2022    COVID-19 (MODERNA) BIVALENT 12+YRS 2022    Flu Vaccine Quad PF 6-35MO 2017, 10/09/2019    Fluzone High-Dose 65+yrs 2021, 2022    Influenza Quad Vaccine (Inpatient) 2020            In-Office Procedure(s):  No orders to display        ASCVD RIsk Score::  The 10-year ASCVD risk score (Chele DK, et al., 2019) is: 27.6%    Values used to calculate the score:      Age: 68 years      Sex: Male      Is Non- : No      Diabetic: No      Tobacco smoker: No      Systolic Blood Pressure: 162 mmHg      Is BP treated: No      HDL Cholesterol: 35 mg/dL      Total Cholesterol: 165 mg/dL    Imaging:         Results for orders placed during the hospital encounter of 23    CT Chest Without Contrast Diagnostic    Narrative  CT CHEST WO CONTRAST DIAGNOSTIC    Date  of Exam: 9/5/2023 10:05 AM EDT    Indication: Aortic aneurysm, known or suspected.    Comparison: 2/2/2023    Technique: Axial CT images were obtained of the chest without contrast administration.  Sagittal and coronal reconstructions were performed.  Automated exposure control and iterative reconstruction methods were used.      Findings:  The ascending aorta is again noted to be aneurysmal, measuring a maximum of 4.5 cm at the mid ascending portion. Coronary and other vascular calcification is noted. Evaluation for mural hematoma or associated dissection is not possible on this  noncontrasted study. There are calcified lymph nodes in the mediastinum and right hilum, with no evidence of adenopathy in the thorax. Visualized thyroid is grossly unremarkable. Central airways are grossly patent. There is mild upper lung predominant  pulmonary emphysematous change. Evaluate patient history and risk factors to see if patient qualifies for low dose lung cancer screening. No suspicious pulmonary nodules or masses are identified. Limited imaging through the upper abdomen demonstrates a  grossly normal adrenal morphology. There is a small hiatal hernia. There is degenerative change in the spine. There are superior endplate central compression deformity at T10 which appears chronic.    Impression  Impression:    1. The ascending aorta is aneurysmal, measuring up to 4.5 cm, which is slightly increased as compared to prior (previously 4.3 cm).  2. Pulmonary emphysematous changes. Please see above.  3. Additional findings as ABOVE.      Electronically Signed: Nikolay Orantes MD  9/5/2023 1:56 PM EDT  Workstation ID: [id number]      Results for orders placed during the hospital encounter of 09/05/23    CT Chest Without Contrast Diagnostic    Narrative  CT CHEST WO CONTRAST DIAGNOSTIC    Date of Exam: 9/5/2023 10:05 AM EDT    Indication: Aortic aneurysm, known or suspected.    Comparison: 2/2/2023    Technique: Axial CT images were  obtained of the chest without contrast administration.  Sagittal and coronal reconstructions were performed.  Automated exposure control and iterative reconstruction methods were used.      Findings:  The ascending aorta is again noted to be aneurysmal, measuring a maximum of 4.5 cm at the mid ascending portion. Coronary and other vascular calcification is noted. Evaluation for mural hematoma or associated dissection is not possible on this  noncontrasted study. There are calcified lymph nodes in the mediastinum and right hilum, with no evidence of adenopathy in the thorax. Visualized thyroid is grossly unremarkable. Central airways are grossly patent. There is mild upper lung predominant  pulmonary emphysematous change. Evaluate patient history and risk factors to see if patient qualifies for low dose lung cancer screening. No suspicious pulmonary nodules or masses are identified. Limited imaging through the upper abdomen demonstrates a  grossly normal adrenal morphology. There is a small hiatal hernia. There is degenerative change in the spine. There are superior endplate central compression deformity at T10 which appears chronic.    Impression  Impression:    1. The ascending aorta is aneurysmal, measuring up to 4.5 cm, which is slightly increased as compared to prior (previously 4.3 cm).  2. Pulmonary emphysematous changes. Please see above.  3. Additional findings as ABOVE.      Electronically Signed: Nikolay Orantes MD  9/5/2023 1:56 PM EDT  Workstation ID: [id number]        Most recent EKG as reviewed and interpreted by me:    ECG 12 Lead    Date/Time: 9/26/2023 11:31 AM  Performed by: Kami Rosario MD  Authorized by: Kami Rosario MD   Comparison: not compared with previous ECG   Previous ECG: no previous ECG available  Rhythm: sinus rhythm  QRS axis: left             Assessment & Plan :       --Ascending aortic aneurysm  CT scan on 9/2023 showing ascending aortic aneurysm measuring 4.5 cm, slightly increased  from 4.3 cm last year  Risk factors-hypertension, smoking.  No family history of aortic aneurysm/aortic dissection  Ultrasound for abdomen did not show abdominal aortic aneurysm  Plan  - Blood pressure log, follow-up in 6 weeks, will need strict blood pressure control  - Complete smoking cessation  - Transthoracic echocardiogram  - Nuclear stress test for coronary artery calcification seen on CT scan  -Annual surveillance CT will be required    Dyslipidemia  Continue home Crestor 20 mg daily       EMR Dragon/Transcription disclaimer:  Much of this encounter note is an electronic transcription/translation of spoken language to printed text. The electronic translation of spoken language may permit erroneous, or at times, nonsensical words or phrases to be inadvertently transcribed; Although I have reviewed the note for such errors, some may still exist.    Kami Rosario MD  09/26/23  .

## 2023-11-03 ENCOUNTER — HOSPITAL ENCOUNTER (OUTPATIENT)
Dept: NUCLEAR MEDICINE | Facility: HOSPITAL | Age: 68
Discharge: HOME | End: 2023-11-03
Payer: MEDICARE

## 2023-11-03 ENCOUNTER — HOSPITAL ENCOUNTER (OUTPATIENT)
Dept: CARDIOLOGY | Facility: HOSPITAL | Age: 68
Discharge: HOME | End: 2023-11-03
Payer: MEDICARE

## 2023-11-03 DIAGNOSIS — I25.84 CORONARY ARTERY CALCIFICATION: ICD-10-CM

## 2023-11-03 DIAGNOSIS — I25.10 CORONARY ARTERY CALCIFICATION: ICD-10-CM

## 2023-11-03 LAB
BH CV ECHO MEAS - ACS: 2 CM
BH CV ECHO MEAS - AI P1/2T: 700.3 MSEC
BH CV ECHO MEAS - AO MAX PG: 6.4 MMHG
BH CV ECHO MEAS - AO MEAN PG: 3 MMHG
BH CV ECHO MEAS - AO V2 MAX: 126 CM/SEC
BH CV ECHO MEAS - AO V2 VTI: 29.3 CM
BH CV ECHO MEAS - AVA(I,D): 3.2 CM2
BH CV ECHO MEAS - EDV(CUBED): 166.4 ML
BH CV ECHO MEAS - EDV(MOD-SP4): 98.3 ML
BH CV ECHO MEAS - EF(MOD-SP4): 50.3 %
BH CV ECHO MEAS - ESV(CUBED): 39.3 ML
BH CV ECHO MEAS - ESV(MOD-SP4): 48.9 ML
BH CV ECHO MEAS - FS: 38.2 %
BH CV ECHO MEAS - IVS/LVPW: 0.83 CM
BH CV ECHO MEAS - IVSD: 1 CM
BH CV ECHO MEAS - LA DIMENSION: 4.4 CM
BH CV ECHO MEAS - LAT PEAK E' VEL: 11.6 CM/SEC
BH CV ECHO MEAS - LV MASS(C)D: 242 GRAMS
BH CV ECHO MEAS - LV MAX PG: 4.4 MMHG
BH CV ECHO MEAS - LV MEAN PG: 2 MMHG
BH CV ECHO MEAS - LV V1 MAX: 105 CM/SEC
BH CV ECHO MEAS - LV V1 VTI: 24.4 CM
BH CV ECHO MEAS - LVIDD: 5.5 CM
BH CV ECHO MEAS - LVIDS: 3.4 CM
BH CV ECHO MEAS - LVOT AREA: 3.8 CM2
BH CV ECHO MEAS - LVOT DIAM: 2.2 CM
BH CV ECHO MEAS - LVPWD: 1.2 CM
BH CV ECHO MEAS - MED PEAK E' VEL: 10.6 CM/SEC
BH CV ECHO MEAS - MV A MAX VEL: 84.9 CM/SEC
BH CV ECHO MEAS - MV DEC SLOPE: 259 CM/SEC2
BH CV ECHO MEAS - MV DEC TIME: 0.21 SEC
BH CV ECHO MEAS - MV E MAX VEL: 83.8 CM/SEC
BH CV ECHO MEAS - MV E/A: 0.99
BH CV ECHO MEAS - MV MAX PG: 3.3 MMHG
BH CV ECHO MEAS - MV MEAN PG: 1 MMHG
BH CV ECHO MEAS - MV P1/2T: 103.7 MSEC
BH CV ECHO MEAS - MV V2 VTI: 30.4 CM
BH CV ECHO MEAS - MVA(P1/2T): 2.12 CM2
BH CV ECHO MEAS - MVA(VTI): 3.1 CM2
BH CV ECHO MEAS - PA V2 MAX: 120.5 CM/SEC
BH CV ECHO MEAS - RV MAX PG: 1.25 MMHG
BH CV ECHO MEAS - RV V1 MAX: 55.9 CM/SEC
BH CV ECHO MEAS - RV V1 VTI: 12.3 CM
BH CV ECHO MEAS - RVDD: 3 CM
BH CV ECHO MEAS - SV(LVOT): 92.8 ML
BH CV ECHO MEAS - SV(MOD-SP4): 49.4 ML
BH CV ECHO MEAS - TAPSE (>1.6): 2.9 CM
BH CV ECHO MEAS - TR MAX PG: 20.8 MMHG
BH CV ECHO MEAS - TR MAX VEL: 228 CM/SEC
BH CV ECHO MEASUREMENTS AVERAGE E/E' RATIO: 7.55
BH CV REST NUCLEAR ISOTOPE DOSE: 11 MCI
BH CV STRESS BP STAGE 1: NORMAL
BH CV STRESS BP STAGE 2: NORMAL
BH CV STRESS COMMENTS STAGE 1: NORMAL
BH CV STRESS COMMENTS STAGE 2: NORMAL
BH CV STRESS DOSE REGADENOSON STAGE 1: 0.4
BH CV STRESS DURATION MIN STAGE 1: 0
BH CV STRESS DURATION MIN STAGE 2: 4
BH CV STRESS DURATION SEC STAGE 1: 10
BH CV STRESS DURATION SEC STAGE 2: 0
BH CV STRESS HR STAGE 1: 55
BH CV STRESS HR STAGE 2: 76
BH CV STRESS NUCLEAR ISOTOPE DOSE: 33 MCI
BH CV STRESS PROTOCOL 1: NORMAL
BH CV STRESS RECOVERY BP: NORMAL MMHG
BH CV STRESS RECOVERY HR: 72 BPM
BH CV STRESS STAGE 1: 1
BH CV STRESS STAGE 2: 2
LV EF NUC BP: 56 %
MAXIMAL PREDICTED HEART RATE: 150 BPM
PERCENT MAX PREDICTED HR: 66.67 %
SINUS: 3.6 CM
STRESS BASELINE BP: NORMAL MMHG
STRESS BASELINE HR: 55 BPM
STRESS PERCENT HR: 78 %
STRESS POST PEAK BP: NORMAL MMHG
STRESS POST PEAK HR: 100 BPM
STRESS TARGET HR: 128 BPM

## 2023-11-03 PROCEDURE — A9502 TC99M TETROFOSMIN: HCPCS | Performed by: STUDENT IN AN ORGANIZED HEALTH CARE EDUCATION/TRAINING PROGRAM

## 2023-11-03 PROCEDURE — 0 TECHNETIUM TETROFOSMIN KIT: Performed by: STUDENT IN AN ORGANIZED HEALTH CARE EDUCATION/TRAINING PROGRAM

## 2023-11-03 PROCEDURE — 25010000002 REGADENOSON 0.4 MG/5ML SOLUTION: Performed by: STUDENT IN AN ORGANIZED HEALTH CARE EDUCATION/TRAINING PROGRAM

## 2023-11-03 PROCEDURE — 93306 TTE W/DOPPLER COMPLETE: CPT

## 2023-11-03 PROCEDURE — 93017 CV STRESS TEST TRACING ONLY: CPT

## 2023-11-03 PROCEDURE — 78452 HT MUSCLE IMAGE SPECT MULT: CPT

## 2023-11-03 RX ORDER — REGADENOSON 0.08 MG/ML
0.4 INJECTION, SOLUTION INTRAVENOUS
Status: COMPLETED | OUTPATIENT
Start: 2023-11-03 | End: 2023-11-03

## 2023-11-03 RX ADMIN — TETROFOSMIN 1 DOSE: 1.38 INJECTION, POWDER, LYOPHILIZED, FOR SOLUTION INTRAVENOUS at 10:08

## 2023-11-03 RX ADMIN — TETROFOSMIN 1 DOSE: 1.38 INJECTION, POWDER, LYOPHILIZED, FOR SOLUTION INTRAVENOUS at 08:57

## 2023-11-03 RX ADMIN — REGADENOSON 0.4 MG: 0.08 INJECTION, SOLUTION INTRAVENOUS at 10:08

## 2023-11-07 ENCOUNTER — OFFICE VISIT (OUTPATIENT)
Dept: CARDIOLOGY | Facility: CLINIC | Age: 68
End: 2023-11-07
Payer: MEDICARE

## 2023-11-07 ENCOUNTER — PATIENT ROUNDING (BHMG ONLY) (OUTPATIENT)
Dept: CARDIOLOGY | Facility: CLINIC | Age: 68
End: 2023-11-07
Payer: MEDICARE

## 2023-11-07 VITALS
BODY MASS INDEX: 23.3 KG/M2 | WEIGHT: 172 LBS | SYSTOLIC BLOOD PRESSURE: 120 MMHG | DIASTOLIC BLOOD PRESSURE: 80 MMHG | RESPIRATION RATE: 18 BRPM | OXYGEN SATURATION: 97 % | HEART RATE: 80 BPM | HEIGHT: 72 IN

## 2023-11-07 DIAGNOSIS — I25.10 CORONARY ARTERY DISEASE INVOLVING NATIVE CORONARY ARTERY OF NATIVE HEART WITHOUT ANGINA PECTORIS: Primary | ICD-10-CM

## 2023-11-07 DIAGNOSIS — I71.21 ANEURYSM OF ASCENDING AORTA WITHOUT RUPTURE: ICD-10-CM

## 2023-11-07 DIAGNOSIS — Z72.0 TOBACCO USE: ICD-10-CM

## 2023-11-07 DIAGNOSIS — R00.2 PALPITATIONS: ICD-10-CM

## 2023-11-07 PROCEDURE — 1159F MED LIST DOCD IN RCRD: CPT | Performed by: STUDENT IN AN ORGANIZED HEALTH CARE EDUCATION/TRAINING PROGRAM

## 2023-11-07 PROCEDURE — 99214 OFFICE O/P EST MOD 30 MIN: CPT | Performed by: STUDENT IN AN ORGANIZED HEALTH CARE EDUCATION/TRAINING PROGRAM

## 2023-11-07 PROCEDURE — 1160F RVW MEDS BY RX/DR IN RCRD: CPT | Performed by: STUDENT IN AN ORGANIZED HEALTH CARE EDUCATION/TRAINING PROGRAM

## 2023-11-07 PROCEDURE — 93000 ELECTROCARDIOGRAM COMPLETE: CPT | Performed by: STUDENT IN AN ORGANIZED HEALTH CARE EDUCATION/TRAINING PROGRAM

## 2023-11-07 RX ORDER — LABETALOL 100 MG/1
100 TABLET, FILM COATED ORAL 2 TIMES DAILY
Qty: 180 TABLET | Refills: 1 | Status: SHIPPED | OUTPATIENT
Start: 2023-11-07

## 2023-11-07 RX ORDER — ROSUVASTATIN CALCIUM 20 MG/1
40 TABLET, COATED ORAL DAILY
Qty: 90 TABLET | Refills: 3 | Status: SHIPPED | OUTPATIENT
Start: 2023-11-07 | End: 2023-11-13

## 2023-11-07 RX ORDER — LABETALOL 100 MG/1
100 TABLET, FILM COATED ORAL 2 TIMES DAILY
Qty: 60 TABLET | Refills: 3 | Status: SHIPPED | OUTPATIENT
Start: 2023-11-07 | End: 2023-11-07

## 2023-11-07 NOTE — PROGRESS NOTES
"      Subjective:     Encounter Date:11/07/2023      Patient ID: Hamlet Ortiz is a 68 y.o. male.    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  Was incidentally found on CT scans done for oral cancer management.     Used to smoke 1 pack a day however has quit for now.  Also mentions exercising a lot in the past and running marathons.  Overall has no limitations in functional status from cardiac standpoint.    11/7/2023-anxious about results, continues to jog and exercise every day without cardiac limitations.  Does not want cardiac catheterization for now.      Objective:         /80 (BP Location: Right arm, Patient Position: Sitting)   Pulse 80   Resp 18   Ht 182.9 cm (72\")   Wt 78 kg (172 lb)   SpO2 97%   BMI 23.33 kg/m²     Physical exam  General-no acute distress  CVS-S1-S2 normal, no murmur  Respiratory-clear breath sounds  GI-soft nontender abdomen  No pedal edema  Alert oriented X3    ROS:  14 point review of systems negative except as mentioned above    Current Outpatient Medications:     rosuvastatin (CRESTOR) 20 MG tablet, Take 2 tablets by mouth Daily., Disp: 90 tablet, Rfl: 3    labetalol (NORMODYNE) 100 MG tablet, Take 1 tablet by mouth 2 (Two) Times a Day., Disp: 60 tablet, Rfl: 3    Problem List:  Patient Active Problem List   Diagnosis    Underweight    Cigarette nicotine dependence with nicotine-induced disorder    Squamous cell cancer of buccal mucosa    History of prediabetes    " Coronary artery disease involving native coronary artery of native heart without angina pectoris    Aneurysm of ascending aorta without rupture    Squamous cell carcinoma of mouth    Oral cancer    COVID-19 vaccine series completed    Tobacco use    Elevated blood pressure reading     Past Medical History:  Past Medical History:   Diagnosis Date    Aneurysm of ascending aorta     Cancer 11-    Squamous Cell carcinoma    Visual impairment 2017    Macular degeneration     Past Surgical History:  Past Surgical History:   Procedure Laterality Date    HERNIA REPAIR      2018    LYMPH NODE BIOPSY  2022    MULTIPLE TOOTH EXTRACTIONS      all teeth removed 2022    SHOULDER SURGERY Left     arthroscopic done      Social History:  Social History     Socioeconomic History    Marital status:    Tobacco Use    Smoking status: Former     Packs/day: 1.00     Years: 30.00     Additional pack years: 0.00     Total pack years: 30.00     Types: Cigarettes     Start date: 1992     Quit date: 2022     Years since quittin.9     Passive exposure: Past    Smokeless tobacco: Former     Types: Chew     Quit date:    Vaping Use    Vaping Use: Never used   Substance and Sexual Activity    Alcohol use: Not Currently     Comment: recovering alcoholic. 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 (2022)    Drug use: Never    Sexual activity: Not Currently     Comment:      Allergies:  No Known Allergies  Immunizations:  Immunization History   Administered Date(s) Administered    COVID-19 (MODERNA) 1st,2nd,3rd Dose Monovalent 2021, 2021, 10/28/2021, 2022    COVID-19 (MODERNA) BIVALENT 12+YRS 2022    Flu Vaccine Quad PF 6-35MO 2017, 10/09/2019    Fluzone High-Dose 65+yrs 2021, 2022    Influenza Quad Vaccine (Inpatient) 2020            In-Office Procedure(s):  ECG 12 Lead    (Results Pending)        ASCVD RIsk Score::  The 10-year ASCVD risk score (Chele BROWNING, et  al., 2019) is: 17.3%    Values used to calculate the score:      Age: 68 years      Sex: Male      Is Non- : No      Diabetic: No      Tobacco smoker: No      Systolic Blood Pressure: 120 mmHg      Is BP treated: No      HDL Cholesterol: 35 mg/dL      Total Cholesterol: 165 mg/dL    Imaging:         Results for orders placed during the hospital encounter of 09/05/23    CT Chest Without Contrast Diagnostic    Narrative  CT CHEST WO CONTRAST DIAGNOSTIC    Date of Exam: 9/5/2023 10:05 AM EDT    Indication: Aortic aneurysm, known or suspected.    Comparison: 2/2/2023    Technique: Axial CT images were obtained of the chest without contrast administration.  Sagittal and coronal reconstructions were performed.  Automated exposure control and iterative reconstruction methods were used.      Findings:  The ascending aorta is again noted to be aneurysmal, measuring a maximum of 4.5 cm at the mid ascending portion. Coronary and other vascular calcification is noted. Evaluation for mural hematoma or associated dissection is not possible on this  noncontrasted study. There are calcified lymph nodes in the mediastinum and right hilum, with no evidence of adenopathy in the thorax. Visualized thyroid is grossly unremarkable. Central airways are grossly patent. There is mild upper lung predominant  pulmonary emphysematous change. Evaluate patient history and risk factors to see if patient qualifies for low dose lung cancer screening. No suspicious pulmonary nodules or masses are identified. Limited imaging through the upper abdomen demonstrates a  grossly normal adrenal morphology. There is a small hiatal hernia. There is degenerative change in the spine. There are superior endplate central compression deformity at T10 which appears chronic.    Impression  Impression:    1. The ascending aorta is aneurysmal, measuring up to 4.5 cm, which is slightly increased as compared to prior (previously 4.3 cm).  2.  Pulmonary emphysematous changes. Please see above.  3. Additional findings as ABOVE.      Electronically Signed: Nikolay Orantes MD  9/5/2023 1:56 PM EDT  Workstation ID: [id number]      Results for orders placed during the hospital encounter of 09/05/23    CT Chest Without Contrast Diagnostic    Narrative  CT CHEST WO CONTRAST DIAGNOSTIC    Date of Exam: 9/5/2023 10:05 AM EDT    Indication: Aortic aneurysm, known or suspected.    Comparison: 2/2/2023    Technique: Axial CT images were obtained of the chest without contrast administration.  Sagittal and coronal reconstructions were performed.  Automated exposure control and iterative reconstruction methods were used.      Findings:  The ascending aorta is again noted to be aneurysmal, measuring a maximum of 4.5 cm at the mid ascending portion. Coronary and other vascular calcification is noted. Evaluation for mural hematoma or associated dissection is not possible on this  noncontrasted study. There are calcified lymph nodes in the mediastinum and right hilum, with no evidence of adenopathy in the thorax. Visualized thyroid is grossly unremarkable. Central airways are grossly patent. There is mild upper lung predominant  pulmonary emphysematous change. Evaluate patient history and risk factors to see if patient qualifies for low dose lung cancer screening. No suspicious pulmonary nodules or masses are identified. Limited imaging through the upper abdomen demonstrates a  grossly normal adrenal morphology. There is a small hiatal hernia. There is degenerative change in the spine. There are superior endplate central compression deformity at T10 which appears chronic.    Impression  Impression:    1. The ascending aorta is aneurysmal, measuring up to 4.5 cm, which is slightly increased as compared to prior (previously 4.3 cm).  2. Pulmonary emphysematous changes. Please see above.  3. Additional findings as ABOVE.      Electronically Signed: Nikolay Orantes MD  9/5/2023 1:56 PM  EDT  Workstation ID: [id number]        Most recent EKG as reviewed and interpreted by me:    ECG 12 Lead    Date/Time: 11/7/2023 10:16 AM  Performed by: Kami Rosario MD    Authorized by: Kami Rosario MD  Comparison: compared with previous ECG   Similar to previous ECG  Rhythm: sinus rhythm    Clinical impression: normal ECG           Most recent echo as reviewed and interpreted by me:  Results for orders placed during the hospital encounter of 11/03/23    Adult Transthoracic Echo Complete W/ Cont if Necessary Per Protocol    Interpretation Summary  Normal left ventricular cavity size  Low normal left ventricular systolic function  Normal right ventricular size and systolic function  At least moderate eccentric aortic insufficiency  Ascending aorta not well visualized      Most recent stress test as reviewed and interpreted by me:  Results for orders placed during the hospital encounter of 11/03/23    Stress Test With Myocardial Perfusion One Day    Interpretation Summary  1. Negative Regadenoson Electrocardiography: There is no ECG evidence of myocardial ischemia.  2.  Adequate blood-pressure response to regadenoson.  3. No regadenoson-induced arrhythmias  4.  Abnormal SPECT Myocardial Perfusion Imaging: There is a moderate sized moderate intensity perfusion defect in the entire inferior wall and mid inferolateral wall which is worse on stress, this is consistent with reginaldo-infarct ischemia.  5. Overall left ventricular function is low normal and no regional asynergy.          Assessment & Plan :         Abnormal nuclear stress test  Inferior territory, 11/2023  Jogs/exercises every day without any cardiac symptoms  Does not want cardiac catheterization for now  Plan  - Start labetalol 100 mg twice daily  - Increase Crestor to 40 mg daily, check lipid panel on follow-up  - Reassess need for cardiac catheterization on follow-up based on symptoms/response to medications    Ascending aortic aneurysm  Moderate eccentric  aortic insufficiency  CT scan on 9/2023 showing ascending aortic aneurysm measuring 4.5 cm  Ultrasound abdomen did not show abdominal aortic aneurysm  Plan  - Strict blood pressure control-goal less than 130/80  - Smoking cessation  - Start labetalol twice daily  - Consider starting ARB on follow-up  - Consider CHACHO for better characterization of aortic insufficiency    Dyslipidemia  , HDL 35 and triglycerides 133 in January 2023  Increase Crestor to 40 mg daily given abnormal stress test  Repeat lipid panel on follow-up    Former smoker               EMR Dragon/Transcription disclaimer:  Much of this encounter note is an electronic transcription/translation of spoken language to printed text. The electronic translation of spoken language may permit erroneous, or at times, nonsensical words or phrases to be inadvertently transcribed; Although I have reviewed the note for such errors, some may still exist.    Kami Rosario MD  11/07/23  .

## 2023-11-07 NOTE — PROGRESS NOTES
A My-Chart message has been sent to the patient for PATIENT ROUNDING with [facility].

## 2023-11-13 ENCOUNTER — TELEPHONE (OUTPATIENT)
Dept: CARDIOLOGY | Facility: CLINIC | Age: 68
End: 2023-11-13
Payer: MEDICARE

## 2023-11-13 RX ORDER — ROSUVASTATIN CALCIUM 40 MG/1
40 TABLET, COATED ORAL DAILY
Qty: 90 TABLET | Refills: 3 | Status: SHIPPED | OUTPATIENT
Start: 2023-11-13

## 2023-11-13 NOTE — TELEPHONE ENCOUNTER
Lmom to let the patient know that a PA was not required because it is a formulary drug. I let him know the pharmacy is aware. OK FOR THE HUB RELEASE.

## 2023-11-13 NOTE — TELEPHONE ENCOUNTER
"    Hub staff attempted to follow warm transfer process and was unsuccessful     Caller: Hamlet Ortiz \"Eriberto\"    Relationship to patient: Self    Best call back number: [phone]    Patient is needing: PATIENT STATED HIS PHARMACY IS NEEDING A PRIOR AUTH FOR ROSUVASTATIN 20 MG TABLET FOR A 45 DAY SUPPLY. PATIENT STATED THAT [employer] HAD FAXED OVER A PRIOR AUTH REQUEST TO OFFICE AND THE PHARMACY HADN'T GOT A RESPONSE. PLEASE FOLLOW UP WITH NEXT STEPS          "

## 2023-11-14 ENCOUNTER — TELEPHONE (OUTPATIENT)
Dept: CARDIOLOGY | Facility: CLINIC | Age: 68
End: 2023-11-14
Payer: MEDICARE

## 2023-11-14 NOTE — TELEPHONE ENCOUNTER
Caller: [employer]    Relationship to patient:     Best call back number: [phone]    Patient is needing: [employer] DID NOT NEED PA FOR ROSUVASTAIN BUT CHECKING TO SEE IF PATIENT PICKED UP MEDICATION.

## 2023-12-19 ENCOUNTER — OFFICE VISIT (OUTPATIENT)
Dept: CARDIOLOGY | Facility: CLINIC | Age: 68
End: 2023-12-19
Payer: MEDICARE

## 2023-12-19 VITALS
HEIGHT: 72 IN | BODY MASS INDEX: 23.22 KG/M2 | WEIGHT: 171.4 LBS | DIASTOLIC BLOOD PRESSURE: 80 MMHG | OXYGEN SATURATION: 99 % | RESPIRATION RATE: 18 BRPM | HEART RATE: 99 BPM | SYSTOLIC BLOOD PRESSURE: 122 MMHG

## 2023-12-19 DIAGNOSIS — I25.10 CORONARY ARTERY DISEASE INVOLVING NATIVE CORONARY ARTERY OF NATIVE HEART WITHOUT ANGINA PECTORIS: Primary | ICD-10-CM

## 2023-12-19 DIAGNOSIS — E78.5 DYSLIPIDEMIA: ICD-10-CM

## 2023-12-19 DIAGNOSIS — I10 ESSENTIAL HYPERTENSION: ICD-10-CM

## 2023-12-19 DIAGNOSIS — Z72.0 TOBACCO USE: ICD-10-CM

## 2023-12-19 DIAGNOSIS — I71.21 ANEURYSM OF ASCENDING AORTA WITHOUT RUPTURE: ICD-10-CM

## 2023-12-19 NOTE — PROGRESS NOTES
"      Subjective:     Encounter Date:12/19/2023      Patient ID: Hamlet Ortiz is a 68 y.o. male.    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  Was incidentally found on CT scans done for oral cancer management.     Used to smoke 1 pack a day however has quit for now.  Also mentions exercising a lot in the past and running marathons.  Overall has no limitations in functional status from cardiac standpoint.     11/7/2023-anxious about results, continues to jog and exercise every day without cardiac limitations.  Does not want cardiac catheterization for now.    12/19/2023-had an episode of viral infection last week which caused significant weakness and cough, tested himself for flu and COVID which were negative.  Has subsequently recovered.  He says his appetite is slowly building up.  Trying to get back to exercising and walking 5 miles which is his usual.  Continues to refuse cardiac catheterization.      Objective:         /80 (BP Location: Left arm, Patient Position: Sitting, Cuff Size: Adult)   Pulse 99   Resp 18   Ht 182.9 cm (72\")   Wt 77.7 kg (171 lb 6.4 oz)   SpO2 99%   BMI 23.25 kg/m²     Physical exam  General-no acute distress  CVS-S1-S2 normal, no murmur  Respiratory-clear breath sounds  GI-soft nontender abdomen  No pedal edema  Alert oriented X3    ROS:  14 point review of systems negative except as mentioned above    Current Outpatient Medications:     labetalol (NORMODYNE) 100 MG tablet, TAKE 1 TABLET BY MOUTH TWICE DAILY, Disp: 180 tablet, Rfl: 1    " rosuvastatin (CRESTOR) 40 MG tablet, Take 1 tablet by mouth Daily., Disp: 90 tablet, Rfl: 3    Problem List:  Patient Active Problem List   Diagnosis    Underweight    Cigarette nicotine dependence with nicotine-induced disorder    Squamous cell cancer of buccal mucosa    History of prediabetes    Coronary artery disease involving native coronary artery of native heart without angina pectoris    Aneurysm of ascending aorta without rupture    Squamous cell carcinoma of mouth    Oral cancer    COVID-19 vaccine series completed    Tobacco use    Elevated blood pressure reading     Past Medical History:  Past Medical History:   Diagnosis Date    Aneurysm of ascending aorta     Cancer 11-    Squamous Cell carcinoma    Visual impairment 2017    Macular degeneration     Past Surgical History:  Past Surgical History:   Procedure Laterality Date    HERNIA REPAIR      2018    LYMPH NODE BIOPSY  2022    MULTIPLE TOOTH EXTRACTIONS      all teeth removed 2022    SHOULDER SURGERY Left     arthroscopic done      Social History:  Social History     Socioeconomic History    Marital status:    Tobacco Use    Smoking status: Former     Packs/day: 1.00     Years: 30.00     Additional pack years: 0.00     Total pack years: 30.00     Types: Cigarettes     Start date: 1992     Quit date: 2022     Years since quittin.0     Passive exposure: Past    Smokeless tobacco: Former     Types: Chew     Quit date:    Vaping Use    Vaping Use: Never used   Substance and Sexual Activity    Alcohol use: Not Currently     Comment: recovering alcoholic. 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 (2022)    Drug use: Never    Sexual activity: Not Currently     Comment:      Allergies:  No Known Allergies  Immunizations:  Immunization History   Administered Date(s) Administered    COVID-19 (MODERNA) 1st,2nd,3rd Dose Monovalent 2021, 2021, 10/28/2021, 2022    COVID-19 (MODERNA) BIVALENT 12+YRS  09/26/2022    Flu Vaccine Quad PF 6-35MO 11/06/2017, 10/09/2019    Fluzone High-Dose 65+yrs 09/08/2021, 09/29/2022    Influenza Quad Vaccine (Inpatient) 09/18/2020            In-Office Procedure(s):  No orders to display        ASCVD RIsk Score::  The ASCVD Risk score (Chele BROWNING, et al., 2019) failed to calculate for the following reasons:    The patient has a prior MI or stroke diagnosis    Imaging:         Results for orders placed during the hospital encounter of 09/05/23    CT Chest Without Contrast Diagnostic    Narrative  CT CHEST WO CONTRAST DIAGNOSTIC    Date of Exam: 9/5/2023 10:05 AM EDT    Indication: Aortic aneurysm, known or suspected.    Comparison: 2/2/2023    Technique: Axial CT images were obtained of the chest without contrast administration.  Sagittal and coronal reconstructions were performed.  Automated exposure control and iterative reconstruction methods were used.      Findings:  The ascending aorta is again noted to be aneurysmal, measuring a maximum of 4.5 cm at the mid ascending portion. Coronary and other vascular calcification is noted. Evaluation for mural hematoma or associated dissection is not possible on this  noncontrasted study. There are calcified lymph nodes in the mediastinum and right hilum, with no evidence of adenopathy in the thorax. Visualized thyroid is grossly unremarkable. Central airways are grossly patent. There is mild upper lung predominant  pulmonary emphysematous change. Evaluate patient history and risk factors to see if patient qualifies for low dose lung cancer screening. No suspicious pulmonary nodules or masses are identified. Limited imaging through the upper abdomen demonstrates a  grossly normal adrenal morphology. There is a small hiatal hernia. There is degenerative change in the spine. There are superior endplate central compression deformity at T10 which appears chronic.    Impression  Impression:    1. The ascending aorta is aneurysmal, measuring up  to 4.5 cm, which is slightly increased as compared to prior (previously 4.3 cm).  2. Pulmonary emphysematous changes. Please see above.  3. Additional findings as ABOVE.      Electronically Signed: Nikolay Orantes MD  9/5/2023 1:56 PM EDT  Workstation ID: [id number]      Results for orders placed during the hospital encounter of 09/05/23    CT Chest Without Contrast Diagnostic    Narrative  CT CHEST WO CONTRAST DIAGNOSTIC    Date of Exam: 9/5/2023 10:05 AM EDT    Indication: Aortic aneurysm, known or suspected.    Comparison: 2/2/2023    Technique: Axial CT images were obtained of the chest without contrast administration.  Sagittal and coronal reconstructions were performed.  Automated exposure control and iterative reconstruction methods were used.      Findings:  The ascending aorta is again noted to be aneurysmal, measuring a maximum of 4.5 cm at the mid ascending portion. Coronary and other vascular calcification is noted. Evaluation for mural hematoma or associated dissection is not possible on this  noncontrasted study. There are calcified lymph nodes in the mediastinum and right hilum, with no evidence of adenopathy in the thorax. Visualized thyroid is grossly unremarkable. Central airways are grossly patent. There is mild upper lung predominant  pulmonary emphysematous change. Evaluate patient history and risk factors to see if patient qualifies for low dose lung cancer screening. No suspicious pulmonary nodules or masses are identified. Limited imaging through the upper abdomen demonstrates a  grossly normal adrenal morphology. There is a small hiatal hernia. There is degenerative change in the spine. There are superior endplate central compression deformity at T10 which appears chronic.    Impression  Impression:    1. The ascending aorta is aneurysmal, measuring up to 4.5 cm, which is slightly increased as compared to prior (previously 4.3 cm).  2. Pulmonary emphysematous changes. Please see above.  3.  Additional findings as ABOVE.      Electronically Signed: Nikolay Orantes MD  9/5/2023 1:56 PM EDT  Workstation ID: [id number]        Most recent EKG as reviewed and interpreted by me:    ECG 12 Lead    Date/Time: 12/19/2023 11:24 AM  Performed by: Kami Rosario MD    Authorized by: Kami Rosario MD  Comparison: compared with previous ECG   Similar to previous ECG  Rhythm: sinus bradycardia           Most recent echo as reviewed and interpreted by me:  Results for orders placed during the hospital encounter of 11/03/23    Adult Transthoracic Echo Complete W/ Cont if Necessary Per Protocol    Interpretation Summary  Normal left ventricular cavity size  Low normal left ventricular systolic function  Normal right ventricular size and systolic function  At least moderate eccentric aortic insufficiency  Ascending aorta not well visualized      Most recent stress test as reviewed and interpreted by me:  Results for orders placed during the hospital encounter of 11/03/23    Stress Test With Myocardial Perfusion One Day    Interpretation Summary  1. Negative Regadenoson Electrocardiography: There is no ECG evidence of myocardial ischemia.  2.  Adequate blood-pressure response to regadenoson.  3. No regadenoson-induced arrhythmias  4.  Abnormal SPECT Myocardial Perfusion Imaging: There is a moderate sized moderate intensity perfusion defect in the entire inferior wall and mid inferolateral wall which is worse on stress, this is consistent with reginaldo-infarct ischemia.  5. Overall left ventricular function is low normal and no regional asynergy.            Assessment & Plan :         Abnormal nuclear stress test  Inferior territory, 11/2023  Jogs/exercises every day without any cardiac symptoms  Does not want cardiac catheterization for now  Plan  - Continue labetalol 100 mg twice daily  - Increase Crestor to 40 mg daily, check lipid panel today  - Reassess need for cardiac catheterization on follow-up based on symptoms/response to  medications     Ascending aortic aneurysm  Moderate eccentric aortic insufficiency  CT scan on 9/2023 showing ascending aortic aneurysm measuring 4.5 cm  Ultrasound abdomen did not show abdominal aortic aneurysm  Plan  - Strict blood pressure control-goal less than 130/80  - Smoking cessation  - Continue labetalol twice daily  - Consider starting ARB on follow-up  - Consider CHACHO for better characterization of aortic insufficiency     Dyslipidemia  , HDL 35 and triglycerides 133 in January 2023  Increase Crestor to 40 mg daily given abnormal stress test, repeat lipid panel follow-up       Former smoker             EMR Dragon/Transcription disclaimer:  Much of this encounter note is an electronic transcription/translation of spoken language to printed text. The electronic translation of spoken language may permit erroneous, or at times, nonsensical words or phrases to be inadvertently transcribed; Although I have reviewed the note for such errors, some may still exist.    Kami Rosario MD  12/19/23  .

## 2024-03-01 ENCOUNTER — OFFICE VISIT (OUTPATIENT)
Dept: FAMILY MEDICINE CLINIC | Facility: CLINIC | Age: 69
End: 2024-03-01
Payer: COMMERCIAL

## 2024-03-01 VITALS
HEIGHT: 72 IN | HEART RATE: 72 BPM | OXYGEN SATURATION: 100 % | TEMPERATURE: 96.9 F | BODY MASS INDEX: 25.17 KG/M2 | DIASTOLIC BLOOD PRESSURE: 74 MMHG | WEIGHT: 185.8 LBS | RESPIRATION RATE: 18 BRPM | SYSTOLIC BLOOD PRESSURE: 122 MMHG

## 2024-03-01 DIAGNOSIS — Z12.5 PROSTATE CANCER SCREENING: ICD-10-CM

## 2024-03-01 DIAGNOSIS — T88.7XXA DRUG SIDE EFFECTS: ICD-10-CM

## 2024-03-01 DIAGNOSIS — Z13.29 THYROID DISORDER SCREEN: ICD-10-CM

## 2024-03-01 DIAGNOSIS — R73.03 PREDIABETES: ICD-10-CM

## 2024-03-01 DIAGNOSIS — Z00.00 MEDICARE ANNUAL WELLNESS VISIT, SUBSEQUENT: Primary | ICD-10-CM

## 2024-03-01 DIAGNOSIS — E78.00 ELEVATED CHOLESTEROL: ICD-10-CM

## 2024-03-01 DIAGNOSIS — Z87.891 PERSONAL HISTORY OF NICOTINE DEPENDENCE: ICD-10-CM

## 2024-03-01 DIAGNOSIS — Z12.11 COLON CANCER SCREENING: ICD-10-CM

## 2024-03-01 PROBLEM — Z72.0 TOBACCO USE: Status: RESOLVED | Noted: 2023-09-26 | Resolved: 2024-03-01

## 2024-03-01 PROBLEM — E78.2 MIXED HYPERLIPIDEMIA: Status: ACTIVE | Noted: 2024-03-01

## 2024-03-01 PROBLEM — Z92.29 COVID-19 VACCINE SERIES COMPLETED: Status: RESOLVED | Noted: 2023-09-25 | Resolved: 2024-03-01

## 2024-03-01 NOTE — PROGRESS NOTES
The ABCs of the Annual Wellness Visit  Subsequent Medicare Wellness Visit    Subjective      Hamlet Ortiz is a 68 y.o. male who presents for a Subsequent Medicare Wellness Visit.    The following portions of the patient's history were reviewed and   updated as appropriate: allergies, current medications, past family history, past medical history, past social history, past surgical history, and problem list.    Compared to one year ago, the patient feels his physical   health is worse.    Compared to one year ago, the patient feels his mental   health is the same.            Recent Hospitalizations:  He was not admitted to the hospital during the last year.       Current Medical Providers:  Patient Care Team:  Nicky Melton DO as PCP - General (Family Medicine)  Carol Salazar MD (Otolaryngology)  Kami Rosario MD as Consulting Physician (Cardiology)  Kushal King MD (Otolaryngology)    Outpatient Medications Prior to Visit   Medication Sig Dispense Refill    labetalol (NORMODYNE) 100 MG tablet TAKE 1 TABLET BY MOUTH TWICE DAILY 180 tablet 1    rosuvastatin (CRESTOR) 40 MG tablet Take 1 tablet by mouth Daily. 90 tablet 3     No facility-administered medications prior to visit.       No opioid medication identified on active medication list. I have reviewed chart for other potential  high risk medication/s and harmful drug interactions in the elderly.        Aspirin is not on active medication list.  Aspirin use is not indicated based on review of current medical condition/s. Risk of harm outweighs potential benefits.  .    Patient Active Problem List   Diagnosis    Underweight    Squamous cell cancer of buccal mucosa    Prediabetes    Coronary artery disease involving native coronary artery of native heart without angina pectoris    Aneurysm of ascending aorta without rupture    Essential hypertension    Mixed hyperlipidemia     Advance Care Planning   Advance Care Planning     Advance Directive is not  "on file.  ACP discussion was held with the patient during this visit. Patient has an advance directive (not in EMR), copy requested.  Patient states that he has had a living will and power of  drawn up about a year and a half ago.  He will bring us in a copy     Objective    Vitals:    24 0818   BP: 122/74   BP Location: Right arm   Patient Position: Sitting   Cuff Size: Adult   Pulse: 72   Resp: 18   Temp: 96.9 °F (36.1 °C)   TempSrc: Temporal   SpO2: 100%  Comment: room air   Weight: 84.3 kg (185 lb 12.8 oz)   Height: 182.9 cm (72\")   PainSc: 0-No pain     Estimated body mass index is 25.2 kg/m² as calculated from the following:    Height as of this encounter: 182.9 cm (72\").    Weight as of this encounter: 84.3 kg (185 lb 12.8 oz).    BMI is within normal parameters. No other follow-up for BMI required.      Does the patient have evidence of cognitive impairment?   No  - ACE III minico/30          HEALTH RISK ASSESSMENT    Smoking Status:  Social History     Tobacco Use   Smoking Status Former    Packs/day: 1.00    Years: 30.00    Additional pack years: 0.00    Total pack years: 30.00    Types: Cigarettes    Start date: 1992    Quit date: 2022    Years since quittin.2    Passive exposure: Past   Smokeless Tobacco Former    Types: Chew    Quit date:      Alcohol Consumption:  Social History     Substance and Sexual Activity   Alcohol Use Not Currently    Comment: recovering alcoholic. 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 (2022)     Fall Risk Screen:    STEADI Fall Risk Assessment was completed, and patient is at LOW risk for falls.Assessment completed on:3/1/2024    Depression Screening:      3/1/2024     8:00 AM   PHQ-2/PHQ-9 Depression Screening   Little Interest or Pleasure in Doing Things 0-->not at all   Feeling Down, Depressed or Hopeless 0-->not at all   PHQ-9: Brief Depression Severity Measure Score 0       Health Habits and Functional and Cognitive Screening:      3/1/2024     " 8:00 AM   Functional & Cognitive Status   Do you have difficulty preparing food and eating? No   Do you have difficulty bathing yourself, getting dressed or grooming yourself? No   Do you have difficulty using the toilet? No   Do you have difficulty moving around from place to place? No   Do you have trouble with steps or getting out of a bed or a chair? No   Current Diet Well Balanced Diet   Dental Exam Up to date   Eye Exam Not up to date   Exercise (times per week) 3 times per week   Current Exercises Include No Regular Exercise;Walking   Do you need help using the phone?  No   Are you deaf or do you have serious difficulty hearing?  No   Do you need help to go to places out of walking distance? No   Do you need help shopping? No   Do you need help preparing meals?  No   Do you need help with housework?  No   Do you need help with laundry? No   Do you need help taking your medications? No   Do you need help managing money? No   Do you ever drive or ride in a car without wearing a seat belt? No   Have you felt unusual stress, anger or loneliness in the last month? No   Who do you live with? Alone   If you need help, do you have trouble finding someone available to you? No   Have you been bothered in the last four weeks by sexual problems? No   Do you have difficulty concentrating, remembering or making decisions? No       Age-appropriate Screening Schedule:  Refer to the list below for future screening recommendations based on patient's age, sex and/or medical conditions. Orders for these recommended tests are listed in the plan section. The patient has been provided with a written plan.    Health Maintenance   Topic Date Due    BMI FOLLOWUP  Never done    COLORECTAL CANCER SCREENING  Never done    TDAP/TD VACCINES (1 - Tdap) Never done    ZOSTER VACCINE (1 of 2) Never done    LIPID PANEL  03/01/2024    LUNG CANCER SCREENING  09/05/2024    ANNUAL PHYSICAL  03/01/2025    HEPATITIS C SCREENING  Completed    COVID-19  Vaccine  Completed    RSV Vaccine - Adults  Completed    INFLUENZA VACCINE  Completed    Pneumococcal Vaccine 65+  Completed    AAA SCREEN (ONE-TIME)  Completed                  CMS Preventative Services Quick Reference  Risk Factors Identified During Encounter:    None Identified    The above risks/problems have been discussed with the patient.  Pertinent information has been shared with the patient in the After Visit Summary.    Diagnoses and all orders for this visit:    1. Medicare annual wellness visit, subsequent (Primary)  -Patient will bring us in a copy of his power of  and living well  -Pertinent screening labs ordered per below    2. Prediabetes  -     Hemoglobin A1c    3. Thyroid disorder screen  -     TSH Rfx On Abnormal To Free T4    4. Prostate cancer screening  -     PSA SCREENING    5. Elevated cholesterol  -     Lipid panel    6. Drug side effects  -     CBC & Differential  -     Comprehensive metabolic panel    7. Personal history of nicotine dependence  -     CT Chest Low Dose Wo; Future    8. Colon cancer screening  -Patient had not done colonoscopy last year.  Patient agreeable to get 1 done  -  Ambulatory Referral to Gastroenterology: [facility]  -Patient was given a packet to fill out and mail to them prior to checkout    9. BMI 25.0-25.9,adult  -Information about nutrition and standardized exercise recommendations added to patient's after visit summary      -Patient went to ENT (at [facility] who is managing his squamous cell cancer of his mouth), they wanted to do a CT of him.  He states he was going to see his PCP soon and wanted to get the CT done and shared with them.  Told patient I would do a CT of his chest for his smoking history but unsure if they wanted a particular CT.  Patient states that he thinks a chest CT is with a rafter      Follow Up:   Next Medicare Wellness visit to be scheduled in 1 year.      An After Visit Summary and PPPS were made  available to the patient.

## 2024-03-02 LAB
ALBUMIN SERPL-MCNC: 4.8 G/DL (ref 3.9–4.9)
ALBUMIN/GLOB SERPL: 1.9 {RATIO} (ref 1.2–2.2)
ALP SERPL-CCNC: 61 IU/L (ref 44–121)
ALT SERPL-CCNC: 24 IU/L (ref 0–44)
AST SERPL-CCNC: 25 IU/L (ref 0–40)
BASOPHILS # BLD AUTO: 0.1 X10E3/UL (ref 0–0.2)
BASOPHILS NFR BLD AUTO: 1 %
BILIRUB SERPL-MCNC: 0.5 MG/DL (ref 0–1.2)
BUN SERPL-MCNC: 15 MG/DL (ref 8–27)
BUN/CREAT SERPL: 15 (ref 10–24)
CALCIUM SERPL-MCNC: 10.5 MG/DL (ref 8.6–10.2)
CHLORIDE SERPL-SCNC: 99 MMOL/L (ref 96–106)
CHOLEST SERPL-MCNC: 124 MG/DL (ref 100–199)
CO2 SERPL-SCNC: 24 MMOL/L (ref 20–29)
CREAT SERPL-MCNC: 1.01 MG/DL (ref 0.76–1.27)
EGFRCR SERPLBLD CKD-EPI 2021: 80 ML/MIN/1.73
EOSINOPHIL # BLD AUTO: 0.2 X10E3/UL (ref 0–0.4)
EOSINOPHIL NFR BLD AUTO: 3 %
ERYTHROCYTE [DISTWIDTH] IN BLOOD BY AUTOMATED COUNT: 14 % (ref 11.6–15.4)
GLOBULIN SER CALC-MCNC: 2.5 G/DL (ref 1.5–4.5)
GLUCOSE SERPL-MCNC: 113 MG/DL (ref 70–99)
HBA1C MFR BLD: 6 % (ref 4.8–5.6)
HCT VFR BLD AUTO: 44.4 % (ref 37.5–51)
HDLC SERPL-MCNC: 43 MG/DL
HGB BLD-MCNC: 14.8 G/DL (ref 13–17.7)
IMM GRANULOCYTES # BLD AUTO: 0 X10E3/UL (ref 0–0.1)
IMM GRANULOCYTES NFR BLD AUTO: 0 %
LDLC SERPL CALC-MCNC: 50 MG/DL (ref 0–99)
LYMPHOCYTES # BLD AUTO: 1.6 X10E3/UL (ref 0.7–3.1)
LYMPHOCYTES NFR BLD AUTO: 29 %
MCH RBC QN AUTO: 30 PG (ref 26.6–33)
MCHC RBC AUTO-ENTMCNC: 33.3 G/DL (ref 31.5–35.7)
MCV RBC AUTO: 90 FL (ref 79–97)
MONOCYTES # BLD AUTO: 0.6 X10E3/UL (ref 0.1–0.9)
MONOCYTES NFR BLD AUTO: 11 %
NEUTROPHILS # BLD AUTO: 3.2 X10E3/UL (ref 1.4–7)
NEUTROPHILS NFR BLD AUTO: 56 %
PLATELET # BLD AUTO: 272 X10E3/UL (ref 150–450)
POTASSIUM SERPL-SCNC: 4.7 MMOL/L (ref 3.5–5.2)
PROT SERPL-MCNC: 7.3 G/DL (ref 6–8.5)
PSA SERPL-MCNC: 1.2 NG/ML (ref 0–4)
RBC # BLD AUTO: 4.93 X10E6/UL (ref 4.14–5.8)
SODIUM SERPL-SCNC: 137 MMOL/L (ref 134–144)
TRIGL SERPL-MCNC: 193 MG/DL (ref 0–149)
TSH SERPL DL<=0.005 MIU/L-ACNC: 2.81 UIU/ML (ref 0.45–4.5)
VLDLC SERPL CALC-MCNC: 31 MG/DL (ref 5–40)
WBC # BLD AUTO: 5.6 X10E3/UL (ref 3.4–10.8)

## 2024-03-05 ENCOUNTER — TELEPHONE (OUTPATIENT)
Dept: FAMILY MEDICINE CLINIC | Facility: CLINIC | Age: 69
End: 2024-03-05
Payer: COMMERCIAL

## 2024-03-05 NOTE — TELEPHONE ENCOUNTER
----- Message from Hamlet Ortiz sent at 3/5/2024  5:56 AM EST -----  Regarding: lab results  Contact: [phone]  Dr Melton,   I noticed that the blood tests from [facility] show me as fasting prior to the test when in fact, I specifically told them that I was NOT fasting. I am not certain if this impacts the interpretation of the results, but it certainly erodes my confidence in [facility].

## 2024-03-05 NOTE — TELEPHONE ENCOUNTER
That would explain the elevated triglycerides.  If that is the case, I think his cholesterol looks very good.  I am not sure that we have to tell him that unless he is concerned about his triglycerides

## 2024-03-12 ENCOUNTER — OFFICE VISIT (OUTPATIENT)
Dept: CARDIOLOGY | Facility: CLINIC | Age: 69
End: 2024-03-12
Payer: COMMERCIAL

## 2024-03-12 VITALS
BODY MASS INDEX: 25.06 KG/M2 | RESPIRATION RATE: 16 BRPM | WEIGHT: 185 LBS | OXYGEN SATURATION: 97 % | HEART RATE: 63 BPM | SYSTOLIC BLOOD PRESSURE: 140 MMHG | DIASTOLIC BLOOD PRESSURE: 75 MMHG | HEIGHT: 72 IN

## 2024-03-12 DIAGNOSIS — C06.0 SQUAMOUS CELL CANCER OF BUCCAL MUCOSA: ICD-10-CM

## 2024-03-12 DIAGNOSIS — R06.09 DOE (DYSPNEA ON EXERTION): Primary | ICD-10-CM

## 2024-03-12 DIAGNOSIS — R94.39 ABNORMAL NUCLEAR STRESS TEST: ICD-10-CM

## 2024-03-12 DIAGNOSIS — I35.1 NONRHEUMATIC AORTIC VALVE INSUFFICIENCY: ICD-10-CM

## 2024-03-12 DIAGNOSIS — I71.21 ANEURYSM OF ASCENDING AORTA WITHOUT RUPTURE: ICD-10-CM

## 2024-03-12 PROCEDURE — 99214 OFFICE O/P EST MOD 30 MIN: CPT | Performed by: STUDENT IN AN ORGANIZED HEALTH CARE EDUCATION/TRAINING PROGRAM

## 2024-03-12 PROCEDURE — 3077F SYST BP >= 140 MM HG: CPT | Performed by: STUDENT IN AN ORGANIZED HEALTH CARE EDUCATION/TRAINING PROGRAM

## 2024-03-12 PROCEDURE — 3078F DIAST BP <80 MM HG: CPT | Performed by: STUDENT IN AN ORGANIZED HEALTH CARE EDUCATION/TRAINING PROGRAM

## 2024-03-12 PROCEDURE — 1159F MED LIST DOCD IN RCRD: CPT | Performed by: STUDENT IN AN ORGANIZED HEALTH CARE EDUCATION/TRAINING PROGRAM

## 2024-03-12 PROCEDURE — 93000 ELECTROCARDIOGRAM COMPLETE: CPT | Performed by: STUDENT IN AN ORGANIZED HEALTH CARE EDUCATION/TRAINING PROGRAM

## 2024-03-12 PROCEDURE — 1160F RVW MEDS BY RX/DR IN RCRD: CPT | Performed by: STUDENT IN AN ORGANIZED HEALTH CARE EDUCATION/TRAINING PROGRAM

## 2024-03-12 RX ORDER — LISINOPRIL 5 MG/1
5 TABLET ORAL DAILY
Qty: 90 TABLET | Refills: 3 | Status: SHIPPED | OUTPATIENT
Start: 2024-03-12

## 2024-03-12 NOTE — PROGRESS NOTES
Subjective:     Encounter Date:03/12/2024      Patient ID: Hamlet Ortiz is a 68 y.o. male.    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  Was incidentally found on CT scans done for oral cancer management.     Used to smoke 1 pack a day however has quit for now.  Also mentions exercising a lot in the past and running marathons.  Overall has no limitations in functional status from cardiac standpoint.     11/7/2023-anxious about results, continues to jog and exercise every day without cardiac limitations.  Does not want cardiac catheterization for now.     12/19/2023-had an episode of viral infection last week which caused significant weakness and cough, tested himself for flu and COVID which were negative.  Has subsequently recovered.  He says his appetite is slowly building up.  Trying to get back to exercising and walking 5 miles which is his usual.  Continues to refuse cardiac catheterization.     3/12/2024-patient endorses dyspnea on exertion while walking up hills, decreased exercise tolerance from before and he attributes that to laziness however also endorses dyspnea on exertion with walking which was not the case a few months back.  Spoke extensively regarding need for cardiac catheterization given abnormal nuclear stress test, multiple risk factors and dyspnea on exertion, also recommend repeat echocardiogram to assess aortic insufficiency.  Will add lisinopril for afterload reduction.        Objective:      "    /75 (BP Location: Right arm, Patient Position: Sitting, Cuff Size: Large Adult)   Pulse 63   Resp 16   Ht 182.9 cm (72.01\")   Wt 83.9 kg (185 lb)   SpO2 97%   BMI 25.08 kg/m²     Physical exam  General-no acute distress  CVS-S1-S2 normal, no murmur  Respiratory-clear breath sounds  GI-soft nontender abdomen  No pedal edema  Alert oriented X3    ROS:  14 point review of systems negative except as mentioned above    Current Outpatient Medications:     labetalol (NORMODYNE) 100 MG tablet, TAKE 1 TABLET BY MOUTH TWICE DAILY, Disp: 180 tablet, Rfl: 1    rosuvastatin (CRESTOR) 40 MG tablet, Take 1 tablet by mouth Daily., Disp: 90 tablet, Rfl: 3    lisinopril (PRINIVIL,ZESTRIL) 5 MG tablet, Take 1 tablet by mouth Daily., Disp: 90 tablet, Rfl: 3    Problem List:  Patient Active Problem List   Diagnosis    Underweight    Squamous cell cancer of buccal mucosa    Prediabetes    Coronary artery disease involving native coronary artery of native heart without angina pectoris    Aneurysm of ascending aorta without rupture    Essential hypertension    Mixed hyperlipidemia     Past Medical History:  Past Medical History:   Diagnosis Date    Aneurysm of ascending aorta     Cancer 11-    Squamous Cell carcinoma    Coronary artery disease     Hyperlipidemia     Hypertension     Visual impairment June 2017    Macular degeneration     Past Surgical History:  Past Surgical History:   Procedure Laterality Date    HERNIA REPAIR  2018    LYMPH NODE BIOPSY  12/29/2022    sqamous cell cancer of mouth excised    MULTIPLE TOOTH EXTRACTIONS      all teeth removed 11/2022    SHOULDER SURGERY Left 2015    arthroscopic done     Social History:  Social History     Socioeconomic History    Marital status:    Tobacco Use    Smoking status: Former     Current packs/day: 0.00     Average packs/day: 1 pack/day for 30.9 years (30.9 ttl pk-yrs)     Types: Cigarettes     Start date: 1/1/1992     Quit date: 12/5/2022     Years " since quittin.2     Passive exposure: Past    Smokeless tobacco: Former     Types: Chew     Quit date:    Vaping Use    Vaping status: Never Used   Substance and Sexual Activity    Alcohol use: Not Currently     Comment: recovering alcoholic. 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 (2022)    Drug use: Never    Sexual activity: Not Currently     Comment:      Allergies:  No Known Allergies  Immunizations:  Immunization History   Administered Date(s) Administered    ABRYSVO (RSV, 60+ or pregnant women 32-36 wks) 2023    COVID-19 (MODERNA) 1st,2nd,3rd Dose Monovalent 2021, 2021, 10/28/2021, 2022    COVID-19 (MODERNA) BIVALENT 12+YRS 2022    COVID-19 F23 (MODERNA) 12YRS+ (SPIKEVAX) 2023    Flu Vaccine Quad PF 6-35MO 2017, 10/09/2019    Fluzone High-Dose 65+yrs 2021, 2022, 2023    Influenza Quad Vaccine (Inpatient) 2020    Pneumococcal Conjugate 20-Valent (PCV20) 2023            In-Office Procedure(s):  No orders to display        ASCVD RIsk Score::  The ASCVD Risk score (Chele DK, et al., 2019) failed to calculate for the following reasons:    The patient has a prior MI or stroke diagnosis    Imaging:         Results for orders placed during the hospital encounter of 23    CT Chest Without Contrast Diagnostic    Narrative  CT CHEST WO CONTRAST DIAGNOSTIC    Date of Exam: 2023 10:05 AM EDT    Indication: Aortic aneurysm, known or suspected.    Comparison: 2023    Technique: Axial CT images were obtained of the chest without contrast administration.  Sagittal and coronal reconstructions were performed.  Automated exposure control and iterative reconstruction methods were used.      Findings:  The ascending aorta is again noted to be aneurysmal, measuring a maximum of 4.5 cm at the mid ascending portion. Coronary and other vascular calcification is noted. Evaluation for mural hematoma or associated dissection is not possible on  this  noncontrasted study. There are calcified lymph nodes in the mediastinum and right hilum, with no evidence of adenopathy in the thorax. Visualized thyroid is grossly unremarkable. Central airways are grossly patent. There is mild upper lung predominant  pulmonary emphysematous change. Evaluate patient history and risk factors to see if patient qualifies for low dose lung cancer screening. No suspicious pulmonary nodules or masses are identified. Limited imaging through the upper abdomen demonstrates a  grossly normal adrenal morphology. There is a small hiatal hernia. There is degenerative change in the spine. There are superior endplate central compression deformity at T10 which appears chronic.    Impression  Impression:    1. The ascending aorta is aneurysmal, measuring up to 4.5 cm, which is slightly increased as compared to prior (previously 4.3 cm).  2. Pulmonary emphysematous changes. Please see above.  3. Additional findings as ABOVE.      Electronically Signed: Nikolay Orantes MD  9/5/2023 1:56 PM EDT  Workstation ID: [id number]      Results for orders placed during the hospital encounter of 09/05/23    CT Chest Without Contrast Diagnostic    Narrative  CT CHEST WO CONTRAST DIAGNOSTIC    Date of Exam: 9/5/2023 10:05 AM EDT    Indication: Aortic aneurysm, known or suspected.    Comparison: 2/2/2023    Technique: Axial CT images were obtained of the chest without contrast administration.  Sagittal and coronal reconstructions were performed.  Automated exposure control and iterative reconstruction methods were used.      Findings:  The ascending aorta is again noted to be aneurysmal, measuring a maximum of 4.5 cm at the mid ascending portion. Coronary and other vascular calcification is noted. Evaluation for mural hematoma or associated dissection is not possible on this  noncontrasted study. There are calcified lymph nodes in the mediastinum and right hilum, with no evidence of adenopathy in the thorax.  Visualized thyroid is grossly unremarkable. Central airways are grossly patent. There is mild upper lung predominant  pulmonary emphysematous change. Evaluate patient history and risk factors to see if patient qualifies for low dose lung cancer screening. No suspicious pulmonary nodules or masses are identified. Limited imaging through the upper abdomen demonstrates a  grossly normal adrenal morphology. There is a small hiatal hernia. There is degenerative change in the spine. There are superior endplate central compression deformity at T10 which appears chronic.    Impression  Impression:    1. The ascending aorta is aneurysmal, measuring up to 4.5 cm, which is slightly increased as compared to prior (previously 4.3 cm).  2. Pulmonary emphysematous changes. Please see above.  3. Additional findings as ABOVE.      Electronically Signed: Nikolay Orantes MD  9/5/2023 1:56 PM EDT  Workstation ID: [id number]        Most recent EKG as reviewed and interpreted by me:    ECG 12 Lead    Date/Time: 3/12/2024 9:43 AM  Performed by: Kami Rosario MD    Authorized by: Kami Rosario MD  Comparison: compared with previous ECG   Similar to previous ECG  Rhythm: sinus rhythm    Clinical impression: normal ECG           Most recent echo as reviewed and interpreted by me:  Results for orders placed during the hospital encounter of 11/03/23    Adult Transthoracic Echo Complete W/ Cont if Necessary Per Protocol    Interpretation Summary  Normal left ventricular cavity size  Low normal left ventricular systolic function  Normal right ventricular size and systolic function  At least moderate eccentric aortic insufficiency  Ascending aorta not well visualized      Most recent stress test as reviewed and interpreted by me:  Results for orders placed during the hospital encounter of 11/03/23    Stress Test With Myocardial Perfusion One Day    Interpretation Summary  1. Negative Regadenoson Electrocardiography: There is no ECG evidence of  myocardial ischemia.  2.  Adequate blood-pressure response to regadenoson.  3. No regadenoson-induced arrhythmias  4.  Abnormal SPECT Myocardial Perfusion Imaging: There is a moderate sized moderate intensity perfusion defect in the entire inferior wall and mid inferolateral wall which is worse on stress, this is consistent with reginaldo-infarct ischemia.  5. Overall left ventricular function is low normal and no regional asynergy.      Most recent cardiac catheterization as reviewed interpreted by me:  No results found for this or any previous visit.      Assessment & Plan :       Abnormal nuclear stress test  Inferior/inferolateral territory, 11/2023  Dyspnea on exertion with climbing up hills  Plan  - Will plan for cardiac catheterization with Dr. Oneil, if patient does have significant coronary artery disease along with worsening aortic insufficiency, open heart surgery might be a better option for him.  - Continue labetalol 100 mg twice daily  - Crestor 40 mg daily at bedtime    Ascending aortic aneurysm  Moderate eccentric aortic insufficiency  CT scan on 9/2023 showing ascending aortic aneurysm measuring 4.5 cm  Ultrasound abdomen did not show abdominal aortic aneurysm  Plan  - Strict blood pressure control-goal less than 130/80  - Add lisinopril 5 mg daily  - Smoking cessation  - Continue labetalol twice daily  - Consider CHACHO for better characterization of aortic insufficiency     Dyslipidemia  , HDL 35 and triglycerides 133 in January 2023  Crestor 40 mg daily at bedtime        Former smoker        Part of this note may be an electronic transcription/translation of spoken language to printed text using the Dragon Dictation System.    Kami Rosario MD  03/12/24  .

## 2024-04-09 ENCOUNTER — HOSPITAL ENCOUNTER (OUTPATIENT)
Dept: CARDIOLOGY | Facility: HOSPITAL | Age: 69
Discharge: HOME | End: 2024-04-09
Admitting: STUDENT IN AN ORGANIZED HEALTH CARE EDUCATION/TRAINING PROGRAM
Payer: COMMERCIAL

## 2024-04-09 VITALS
SYSTOLIC BLOOD PRESSURE: 111 MMHG | DIASTOLIC BLOOD PRESSURE: 66 MMHG | HEIGHT: 72 IN | WEIGHT: 175 LBS | BODY MASS INDEX: 23.7 KG/M2

## 2024-04-09 DIAGNOSIS — R06.09 DOE (DYSPNEA ON EXERTION): ICD-10-CM

## 2024-04-09 PROCEDURE — 93306 TTE W/DOPPLER COMPLETE: CPT

## 2024-04-09 PROCEDURE — 93356 MYOCRD STRAIN IMG SPCKL TRCK: CPT

## 2024-04-10 LAB
BH CV ECHO LEFT VENTRICLE GLOBAL LONGITUDINAL STRAIN: -22.3 %
BH CV ECHO MEAS - AO MAX PG: 6 MMHG
BH CV ECHO MEAS - AO MEAN PG: 3 MMHG
BH CV ECHO MEAS - AO V2 MAX: 122 CM/SEC
BH CV ECHO MEAS - AO V2 VTI: 29.8 CM
BH CV ECHO MEAS - AVA(I,D): 2.6 CM2
BH CV ECHO MEAS - EDV(CUBED): 103.8 ML
BH CV ECHO MEAS - EDV(MOD-SP4): 75.1 ML
BH CV ECHO MEAS - EF(MOD-BP): 54 %
BH CV ECHO MEAS - EF(MOD-SP4): 53.5 %
BH CV ECHO MEAS - ESV(CUBED): 29.8 ML
BH CV ECHO MEAS - ESV(MOD-SP4): 34.9 ML
BH CV ECHO MEAS - FS: 34 %
BH CV ECHO MEAS - IVS/LVPW: 1.1 CM
BH CV ECHO MEAS - IVSD: 1.1 CM
BH CV ECHO MEAS - LA DIMENSION: 4.4 CM
BH CV ECHO MEAS - LAT PEAK E' VEL: 6.6 CM/SEC
BH CV ECHO MEAS - LV DIASTOLIC VOL/BSA (35-75): 37.3 CM2
BH CV ECHO MEAS - LV MASS(C)D: 175.8 GRAMS
BH CV ECHO MEAS - LV MAX PG: 2.11 MMHG
BH CV ECHO MEAS - LV MEAN PG: 1 MMHG
BH CV ECHO MEAS - LV SYSTOLIC VOL/BSA (12-30): 17.3 CM2
BH CV ECHO MEAS - LV V1 MAX: 72.7 CM/SEC
BH CV ECHO MEAS - LV V1 VTI: 20 CM
BH CV ECHO MEAS - LVIDD: 4.7 CM
BH CV ECHO MEAS - LVIDS: 3.1 CM
BH CV ECHO MEAS - LVOT AREA: 3.8 CM2
BH CV ECHO MEAS - LVOT DIAM: 2.2 CM
BH CV ECHO MEAS - LVPWD: 1 CM
BH CV ECHO MEAS - MED PEAK E' VEL: 6.4 CM/SEC
BH CV ECHO MEAS - MR MAX PG: 82.4 MMHG
BH CV ECHO MEAS - MR MAX VEL: 454 CM/SEC
BH CV ECHO MEAS - MV A MAX VEL: 87.5 CM/SEC
BH CV ECHO MEAS - MV DEC SLOPE: 209 CM/SEC2
BH CV ECHO MEAS - MV DEC TIME: 0.34 SEC
BH CV ECHO MEAS - MV E MAX VEL: 63.4 CM/SEC
BH CV ECHO MEAS - MV E/A: 0.72
BH CV ECHO MEAS - MV MAX PG: 3.1 MMHG
BH CV ECHO MEAS - MV MEAN PG: 1 MMHG
BH CV ECHO MEAS - MV P1/2T: 110.9 MSEC
BH CV ECHO MEAS - MV V2 VTI: 33.3 CM
BH CV ECHO MEAS - MVA(P1/2T): 1.98 CM2
BH CV ECHO MEAS - MVA(VTI): 2.28 CM2
BH CV ECHO MEAS - PA V2 MAX: 102 CM/SEC
BH CV ECHO MEAS - PULM A REVS DUR: 0.12 SEC
BH CV ECHO MEAS - PULM A REVS VEL: 21.8 CM/SEC
BH CV ECHO MEAS - PULM DIAS VEL: 38.2 CM/SEC
BH CV ECHO MEAS - PULM S/D: 1.26
BH CV ECHO MEAS - PULM SYS VEL: 48 CM/SEC
BH CV ECHO MEAS - RAP SYSTOLE: 3 MMHG
BH CV ECHO MEAS - RV MAX PG: 0.64 MMHG
BH CV ECHO MEAS - RV V1 MAX: 39.9 CM/SEC
BH CV ECHO MEAS - RV V1 VTI: 8.7 CM
BH CV ECHO MEAS - RVSP: 18.8 MMHG
BH CV ECHO MEAS - SI(MOD-SP4): 20 ML/M2
BH CV ECHO MEAS - SV(LVOT): 76 ML
BH CV ECHO MEAS - SV(MOD-SP4): 40.2 ML
BH CV ECHO MEAS - TAPSE (>1.6): 2.7 CM
BH CV ECHO MEAS - TR MAX PG: 15.8 MMHG
BH CV ECHO MEAS - TR MAX VEL: 199 CM/SEC
BH CV ECHO MEASUREMENTS AVERAGE E/E' RATIO: 9.75
BH CV XLRA - TDI S': 10.4 CM/SEC
LEFT ATRIUM VOLUME INDEX: 24.3 ML/M2
SINUS: 3.6 CM

## 2024-04-11 DIAGNOSIS — I35.1 NONRHEUMATIC AORTIC VALVE INSUFFICIENCY: Primary | ICD-10-CM

## 2024-04-11 DIAGNOSIS — R94.39 ABNORMAL NUCLEAR STRESS TEST: Primary | ICD-10-CM

## 2024-04-13 ENCOUNTER — LAB (OUTPATIENT)
Dept: LAB | Facility: HOSPITAL | Age: 69
End: 2024-04-13
Payer: COMMERCIAL

## 2024-04-13 DIAGNOSIS — R94.39 ABNORMAL NUCLEAR STRESS TEST: ICD-10-CM

## 2024-04-13 LAB
ANION GAP SERPL CALCULATED.3IONS-SCNC: 11 MMOL/L (ref 5–15)
APTT PPP: 29.5 SECONDS (ref 24–31)
BUN SERPL-MCNC: 19 MG/DL (ref 8–23)
BUN/CREAT SERPL: 19.2 (ref 7–25)
CALCIUM SPEC-SCNC: 9.3 MG/DL (ref 8.6–10.5)
CHLORIDE SERPL-SCNC: 102 MMOL/L (ref 98–107)
CO2 SERPL-SCNC: 23 MMOL/L (ref 22–29)
CREAT SERPL-MCNC: 0.99 MG/DL (ref 0.76–1.27)
DEPRECATED RDW RBC AUTO: 42.4 FL (ref 37–54)
EGFRCR SERPLBLD CKD-EPI 2021: 81.9 ML/MIN/1.73
ERYTHROCYTE [DISTWIDTH] IN BLOOD BY AUTOMATED COUNT: 12.7 % (ref 12.3–15.4)
GLUCOSE SERPL-MCNC: 121 MG/DL (ref 65–99)
HCT VFR BLD AUTO: 45.1 % (ref 37.5–51)
HGB BLD-MCNC: 14.2 G/DL (ref 13–17.7)
INR PPP: 1.04 (ref 0.93–1.1)
MCH RBC QN AUTO: 28.7 PG (ref 26.6–33)
MCHC RBC AUTO-ENTMCNC: 31.5 G/DL (ref 31.5–35.7)
MCV RBC AUTO: 91.3 FL (ref 79–97)
PLATELET # BLD AUTO: 280 10*3/MM3 (ref 140–450)
PMV BLD AUTO: 9.8 FL (ref 6–12)
POTASSIUM SERPL-SCNC: 4.4 MMOL/L (ref 3.5–5.2)
PROTHROMBIN TIME: 11.3 SECONDS (ref 9.6–11.7)
RBC # BLD AUTO: 4.94 10*6/MM3 (ref 4.14–5.8)
SODIUM SERPL-SCNC: 136 MMOL/L (ref 136–145)
WBC NRBC COR # BLD AUTO: 4.81 10*3/MM3 (ref 3.4–10.8)

## 2024-04-13 PROCEDURE — 80048 BASIC METABOLIC PNL TOTAL CA: CPT

## 2024-04-13 PROCEDURE — 36415 COLL VENOUS BLD VENIPUNCTURE: CPT

## 2024-04-13 PROCEDURE — 85027 COMPLETE CBC AUTOMATED: CPT

## 2024-04-13 PROCEDURE — 85730 THROMBOPLASTIN TIME PARTIAL: CPT

## 2024-04-13 PROCEDURE — 85610 PROTHROMBIN TIME: CPT

## 2024-04-16 ENCOUNTER — ANESTHESIA (OUTPATIENT)
Dept: CARDIOLOGY | Facility: HOSPITAL | Age: 69
End: 2024-04-16
Payer: COMMERCIAL

## 2024-04-16 ENCOUNTER — HOSPITAL ENCOUNTER (OUTPATIENT)
Facility: HOSPITAL | Age: 69
Setting detail: HOSPITAL OUTPATIENT SURGERY
Discharge: HOME | End: 2024-04-16
Attending: INTERNAL MEDICINE | Admitting: INTERNAL MEDICINE
Payer: COMMERCIAL

## 2024-04-16 ENCOUNTER — ANESTHESIA EVENT (OUTPATIENT)
Dept: CARDIOLOGY | Facility: HOSPITAL | Age: 69
End: 2024-04-16
Payer: COMMERCIAL

## 2024-04-16 ENCOUNTER — HOSPITAL ENCOUNTER (OUTPATIENT)
Dept: CARDIOLOGY | Facility: HOSPITAL | Age: 69
Discharge: HOME | End: 2024-04-16
Payer: COMMERCIAL

## 2024-04-16 VITALS
HEART RATE: 60 BPM | DIASTOLIC BLOOD PRESSURE: 50 MMHG | RESPIRATION RATE: 14 BRPM | SYSTOLIC BLOOD PRESSURE: 99 MMHG | OXYGEN SATURATION: 96 %

## 2024-04-16 VITALS
WEIGHT: 171.74 LBS | HEART RATE: 64 BPM | HEIGHT: 71 IN | DIASTOLIC BLOOD PRESSURE: 70 MMHG | SYSTOLIC BLOOD PRESSURE: 144 MMHG | RESPIRATION RATE: 20 BRPM | BODY MASS INDEX: 24.04 KG/M2 | OXYGEN SATURATION: 98 % | TEMPERATURE: 97.8 F

## 2024-04-16 DIAGNOSIS — R94.39 ABNORMAL NUCLEAR STRESS TEST: ICD-10-CM

## 2024-04-16 DIAGNOSIS — I35.1 NONRHEUMATIC AORTIC VALVE INSUFFICIENCY: ICD-10-CM

## 2024-04-16 LAB
BH CV ECHO MEAS - AI P1/2T: 548.9 MSEC
BH CV ECHO MEAS - AO ROOT DIAM: 3.7 CM
BH CV ECHO SHUNT ASSESSMENT PERFORMED (HIDDEN SCRIPTING): 1

## 2024-04-16 PROCEDURE — 93320 DOPPLER ECHO COMPLETE: CPT | Performed by: STUDENT IN AN ORGANIZED HEALTH CARE EDUCATION/TRAINING PROGRAM

## 2024-04-16 PROCEDURE — 93312 ECHO TRANSESOPHAGEAL: CPT | Performed by: STUDENT IN AN ORGANIZED HEALTH CARE EDUCATION/TRAINING PROGRAM

## 2024-04-16 PROCEDURE — 25010000002 FENTANYL CITRATE (PF) 100 MCG/2ML SOLUTION: Performed by: INTERNAL MEDICINE

## 2024-04-16 PROCEDURE — 25010000002 MIDAZOLAM PER 1 MG: Performed by: INTERNAL MEDICINE

## 2024-04-16 PROCEDURE — 25010000002 PROPOFOL 200 MG/20ML EMULSION: Performed by: NURSE ANESTHETIST, CERTIFIED REGISTERED

## 2024-04-16 PROCEDURE — 93325 DOPPLER ECHO COLOR FLOW MAPG: CPT | Performed by: STUDENT IN AN ORGANIZED HEALTH CARE EDUCATION/TRAINING PROGRAM

## 2024-04-16 PROCEDURE — 25510000001 IOPAMIDOL PER 1 ML: Performed by: INTERNAL MEDICINE

## 2024-04-16 PROCEDURE — C1760 CLOSURE DEV, VASC: HCPCS | Performed by: INTERNAL MEDICINE

## 2024-04-16 PROCEDURE — 25810000003 SODIUM CHLORIDE 0.9 % SOLUTION: Performed by: INTERNAL MEDICINE

## 2024-04-16 PROCEDURE — 93312 ECHO TRANSESOPHAGEAL: CPT

## 2024-04-16 PROCEDURE — C1894 INTRO/SHEATH, NON-LASER: HCPCS | Performed by: INTERNAL MEDICINE

## 2024-04-16 PROCEDURE — 93458 L HRT ARTERY/VENTRICLE ANGIO: CPT | Performed by: INTERNAL MEDICINE

## 2024-04-16 PROCEDURE — 25810000003 SODIUM CHLORIDE 0.9 % SOLUTION: Performed by: NURSE ANESTHETIST, CERTIFIED REGISTERED

## 2024-04-16 PROCEDURE — 25010000002 PROPOFOL 500 MG/50ML EMULSION: Performed by: NURSE ANESTHETIST, CERTIFIED REGISTERED

## 2024-04-16 PROCEDURE — 93320 DOPPLER ECHO COMPLETE: CPT

## 2024-04-16 PROCEDURE — C1769 GUIDE WIRE: HCPCS | Performed by: INTERNAL MEDICINE

## 2024-04-16 PROCEDURE — 93325 DOPPLER ECHO COLOR FLOW MAPG: CPT

## 2024-04-16 RX ORDER — SODIUM CHLORIDE 9 MG/ML
250 INJECTION, SOLUTION INTRAVENOUS ONCE AS NEEDED
Status: DISCONTINUED | OUTPATIENT
Start: 2024-04-16 | End: 2024-04-16 | Stop reason: HOSPADM

## 2024-04-16 RX ORDER — ACETAMINOPHEN 325 MG/1
650 TABLET ORAL EVERY 4 HOURS PRN
Status: DISCONTINUED | OUTPATIENT
Start: 2024-04-16 | End: 2024-04-16 | Stop reason: HOSPADM

## 2024-04-16 RX ORDER — LIDOCAINE HYDROCHLORIDE 20 MG/ML
INJECTION, SOLUTION INFILTRATION; PERINEURAL
Status: DISCONTINUED | OUTPATIENT
Start: 2024-04-16 | End: 2024-04-16 | Stop reason: HOSPADM

## 2024-04-16 RX ORDER — SODIUM CHLORIDE 9 MG/ML
INJECTION, SOLUTION INTRAVENOUS
Status: COMPLETED | OUTPATIENT
Start: 2024-04-16 | End: 2024-04-16

## 2024-04-16 RX ORDER — ASPIRIN 81 MG/1
81 TABLET ORAL DAILY
Status: DISCONTINUED | OUTPATIENT
Start: 2024-04-16 | End: 2024-04-16 | Stop reason: HOSPADM

## 2024-04-16 RX ORDER — PROPOFOL 10 MG/ML
INJECTION, EMULSION INTRAVENOUS CONTINUOUS PRN
Status: DISCONTINUED | OUTPATIENT
Start: 2024-04-16 | End: 2024-04-16 | Stop reason: SURG

## 2024-04-16 RX ORDER — NITROGLYCERIN 0.4 MG/1
0.4 TABLET SUBLINGUAL
Status: DISCONTINUED | OUTPATIENT
Start: 2024-04-16 | End: 2024-04-16 | Stop reason: HOSPADM

## 2024-04-16 RX ORDER — EPHEDRINE SULFATE 5 MG/ML
INJECTION INTRAVENOUS AS NEEDED
Status: DISCONTINUED | OUTPATIENT
Start: 2024-04-16 | End: 2024-04-16 | Stop reason: SURG

## 2024-04-16 RX ORDER — LIDOCAINE HYDROCHLORIDE 20 MG/ML
INJECTION, SOLUTION EPIDURAL; INFILTRATION; INTRACAUDAL; PERINEURAL AS NEEDED
Status: DISCONTINUED | OUTPATIENT
Start: 2024-04-16 | End: 2024-04-16 | Stop reason: SURG

## 2024-04-16 RX ORDER — MIDAZOLAM HYDROCHLORIDE 1 MG/ML
INJECTION INTRAMUSCULAR; INTRAVENOUS
Status: DISCONTINUED | OUTPATIENT
Start: 2024-04-16 | End: 2024-04-16 | Stop reason: HOSPADM

## 2024-04-16 RX ORDER — PROPOFOL 10 MG/ML
INJECTION, EMULSION INTRAVENOUS AS NEEDED
Status: DISCONTINUED | OUTPATIENT
Start: 2024-04-16 | End: 2024-04-16 | Stop reason: SURG

## 2024-04-16 RX ORDER — FENTANYL CITRATE 50 UG/ML
INJECTION, SOLUTION INTRAMUSCULAR; INTRAVENOUS
Status: DISCONTINUED | OUTPATIENT
Start: 2024-04-16 | End: 2024-04-16 | Stop reason: HOSPADM

## 2024-04-16 RX ORDER — SODIUM CHLORIDE 9 MG/ML
INJECTION, SOLUTION INTRAVENOUS CONTINUOUS PRN
Status: DISCONTINUED | OUTPATIENT
Start: 2024-04-16 | End: 2024-04-16 | Stop reason: SURG

## 2024-04-16 RX ADMIN — SODIUM CHLORIDE: 9 INJECTION, SOLUTION INTRAVENOUS at 12:29

## 2024-04-16 RX ADMIN — EPHEDRINE SULFATE 7.5 MG: 5 INJECTION INTRAVENOUS at 12:42

## 2024-04-16 RX ADMIN — LIDOCAINE HYDROCHLORIDE 60 MG: 20 INJECTION, SOLUTION EPIDURAL; INFILTRATION; INTRACAUDAL; PERINEURAL at 12:30

## 2024-04-16 RX ADMIN — PROPOFOL 70 MG: 10 INJECTION, EMULSION INTRAVENOUS at 12:30

## 2024-04-16 RX ADMIN — PROPOFOL 100 MCG/KG/MIN: 10 INJECTION, EMULSION INTRAVENOUS at 12:31

## 2024-04-16 NOTE — H&P
Referring Provider: Dr. Melton medicine    Attending: Prince Oneil MD    Reason for admission:    Abnormal stress test  Hypertension  Hyperlipidemia  Shortness of breath    Chief complaint:    Abnormal stress test  Shortness of breath     History of present illness:     Patient is a 68-year-old white gentleman whose past medical history is significant for head and neck tumor, aortic aneurysm, who is admitted for elective cardiac catheterization.    Patient recently was evaluated by Dr. Rosario for symptom of shortness of breath and chest discomfort.  Patient underwent stress test which showed inferior myocardial infarction with reginaldo-infarct ischemia.  Patient is also noted to have moderate aortic insufficiency with aortic sinus/root dilatation with size of 4.5 cm on CAT scan.    Patient came today for elective cardiac catheterization.  Patient denies any active chest pain.  No orthopnea PND no syncope or presyncope.  No leg edema noted    Review of Systems  Review of Systems   Constitutional: Negative for chills and fever.   HENT:  Negative for ear discharge and nosebleeds.    Eyes:  Negative for discharge and redness.   Cardiovascular:  Negative for chest pain, orthopnea, palpitations, paroxysmal nocturnal dyspnea and syncope.   Respiratory:  Positive for shortness of breath. Negative for cough and wheezing.    Endocrine: Negative for heat intolerance.   Skin:  Negative for rash.   Musculoskeletal:  Negative for arthritis and myalgias.   Gastrointestinal:  Negative for abdominal pain, melena, nausea and vomiting.   Genitourinary:  Negative for dysuria and hematuria.   Neurological:  Negative for dizziness, light-headedness, numbness and tremors.   Psychiatric/Behavioral:  Negative for depression. The patient is not nervous/anxious.        Past Medical History  Past Medical History:   Diagnosis Date    Aneurysm of ascending aorta     Cancer 11-    Squamous Cell carcinoma    Coronary artery disease      "Hyperlipidemia     Hypertension     Visual impairment 2017    Macular degeneration    and   Past Surgical History:   Procedure Laterality Date    HERNIA REPAIR  2018    LYMPH NODE BIOPSY  2022    sqamous cell cancer of mouth excised    MULTIPLE TOOTH EXTRACTIONS      all teeth removed 2022    SHOULDER SURGERY Left 2015    arthroscopic done       Family History  Family History   Problem Relation Age of Onset    Heart failure Mother     Arthritis Mother     Heart disease Mother     Kidney cancer Father     Alcohol abuse Father     Hyperlipidemia Brother     Stroke Maternal Grandmother     Lung cancer Paternal Grandfather        Social History  Social History     Socioeconomic History    Marital status:    Tobacco Use    Smoking status: Former     Current packs/day: 0.00     Average packs/day: 1 pack/day for 30.9 years (30.9 ttl pk-yrs)     Types: Cigarettes     Start date: 1992     Quit date: 2022     Years since quittin.3     Passive exposure: Past    Smokeless tobacco: Former     Types: Chew     Quit date:    Vaping Use    Vaping status: Never Used   Substance and Sexual Activity    Alcohol use: Not Currently     Comment: recovering alcoholic. 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 (2022)    Drug use: Never    Sexual activity: Not Currently     Comment:        Objective     Physical Exam:    Vital Signs  Vitals:    24 1032   BP: 145/62   BP Location: Right arm   Pulse: 60   Resp: 16   Temp: 97.8 °F (36.6 °C)   TempSrc: Oral   SpO2: 99%   Weight: 77.9 kg (171 lb 11.8 oz)   Height: 180.3 cm (71\")       Weight  Flowsheet Rows      Flowsheet Row First Filed Value   Admission Height 180.3 cm (71\") Documented at 2024 1032   Admission Weight 77.9 kg (171 lb 11.8 oz) Documented at 2024 1032             Constitutional:       Appearance: Well-developed.   Eyes:      General: No scleral icterus.        Right eye: No discharge.   HENT:      Head: Normocephalic and atraumatic. "   Neck:      Thyroid: No thyromegaly.      Lymphadenopathy: No cervical adenopathy.   Pulmonary:      Effort: Pulmonary effort is normal. No respiratory distress.      Breath sounds: Normal breath sounds. No wheezing. No rales.   Cardiovascular:      Normal rate. Regular rhythm.      No gallop.    Edema:     Peripheral edema absent.   Abdominal:      Tenderness: There is no abdominal tenderness.   Skin:     Findings: No erythema or rash.   Neurological:      Mental Status: Alert and oriented to person, place, and time.         Results Review:  Lab Results (last 24 hours)       ** No results found for the last 24 hours. **          Imaging Results (Last 72 Hours)       ** No results found for the last 72 hours. **          No orders to display     CBC    Results from last 7 days   Lab Units 04/13/24  0936   WBC 10*3/mm3 4.81   HEMOGLOBIN g/dL 14.2   PLATELETS 10*3/mm3 280     BMP   Results from last 7 days   Lab Units 04/13/24  0936   SODIUM mmol/L 136   POTASSIUM mmol/L 4.4   CHLORIDE mmol/L 102   CO2 mmol/L 23.0   BUN mg/dL 19   CREATININE mg/dL 0.99   GLUCOSE mg/dL 121*     CMP   Results from last 7 days   Lab Units 04/13/24  0936   SODIUM mmol/L 136   POTASSIUM mmol/L 4.4   CHLORIDE mmol/L 102   CO2 mmol/L 23.0   BUN mg/dL 19   CREATININE mg/dL 0.99   GLUCOSE mg/dL 121*     Medication Review  Scheduled Meds:  Continuous Infusions:No current facility-administered medications for this encounter.    PRN Meds:.    Assessment:      Abnormal nuclear stress test        Plan:    MDM:    1.  Abnormal nuclear stress test:    Patient had abnormal stress test I would recommend to proceed with cardiac catheterization.  Risk and benefit and regenerative options are explained.    2.  Hypertension:    Patient is on lisinopril    3.  Aortic regurgitation:    CHACHO showed preserved left ventricular function with aortic insufficiency.    I discussed the patient's findings and my recommendations with patient    Prince Oneil,  MD  04/16/24  12:55 EDT

## 2024-04-16 NOTE — ANESTHESIA POSTPROCEDURE EVALUATION
Patient: Hamlet Ortiz    Procedure Summary       Date: 04/16/24 Room / Location: [facility]    Anesthesia Start: 1229 Anesthesia Stop: 1247    Procedure: ADULT TRANSESOPHAGEAL ECHO (CHACHO) W/ CONT IF NECESSARY PER PROTOCOL Diagnosis:       Nonrheumatic aortic valve insufficiency      (Valvular Disease)    Scheduled Providers: Kami Rosario MD Provider: Alina Mcknight MD    Anesthesia Type: MAC ASA Status: 3            Anesthesia Type: MAC    Vitals  Vitals Value Taken Time   /65 04/16/24 1326   Temp     Pulse 55 04/16/24 1326   Resp 20 04/16/24 1326   SpO2 98 % 04/16/24 1326           Post Anesthesia Care and Evaluation    Patient location during evaluation: PACU  Patient participation: complete - patient participated  Level of consciousness: awake and alert  Pain management: satisfactory to patient    Airway patency: patent  Anesthetic complications: No anesthetic complications  PONV Status: none  Cardiovascular status: acceptable  Respiratory status: acceptable  Hydration status: acceptable

## 2024-04-16 NOTE — DISCHARGE INSTR - ACTIVITY
CHACHO DC Instructions    The medication, which was used to put the patient to sleep, will be acting in your body for the next twenty-four (24) hours, so you might feel a little sleepy; this feeling will slowly wear off.  Because the medicine is still in your system for the next twenty-four (24) hours, the adult patient SHOULD NOT:    Drive a car, operate machinery, or power tools  Drink any alcoholic drinks (not even beer)  Make any important decisions such as to sign important papers    We strongly suggest that a responsible adult be with the patient the rest of the day.    Any problems with:    EXCESSIVE MUCOUS  SPITTING UP BLOOD  SORE THROAT AT MORE THAN 72 HOURS    CALL THE [facility] -[phone].     Post Cath Instructions    Specific Physician Instructions:     Drink plenty of fluids for the next 24 hours.  This helps to eliminate the dye used in your procedure through urination.  You may resume a normal diet; however, try to avoid foods that would cause gas or constipation.    Sedative medication given to you during your catheterization may decrease your judgement and reaction time for up to 24-48 hours.  Therefore:  DO NOT drive or operate hazardous machinery (48 hours)  DO NOT consume alcoholic beverages  DO NOT make any important/legal decisions  Have someone stay with you for at least 24 hours    To allow proper healing and prevent bleeding, the following activities are to be strictly avoided for the next 24-48 hours:  Excessive bending at wound site  Straining (anything that would tense up muscles around the affected puncture site)  Lifting objects greater than 5 pounds, pushing, or pulling for 5 days  For Groin Cases:  Refrain from sexual activity  Refrain from running or vigorous walking  No prolonged sitting or standing  Limit stair climbing as much as possible    Keep the puncture site clean and dry.  You may remove the dressing tomorrow and replace it with a band-aid for  at least one additional day.  Gently clean the site with mild soap and water.  No scrubbing/rubbing and lightly pat the area dry.  Showers are acceptable; however, avoid submerging in water (tub baths, hot tubs, swimming pools, dishwater, etc…) for at least one week.  The site should be completely healed before resuming these activities to reduce the risk of infection.  Check the site often.  Watch for signs and symptoms of infection and notify your physician if any of the following occur:  Bleeding or an increase in swelling at the puncture site  Fever  Increased soreness around puncture site  Foul odor or significant drainage from the puncture site  Swelling, redness, or warmth at the puncture site    **A bruise or small “pea sized” lump under the skin at the puncture site is not unusual.  This should disappear within 3-4 weeks.**  CONTACT YOUR PHYSICIAN OR CALL 911 IF YOU EXPERIENCE ANY OF THE FOLLOWING:  Increased angina (chest pain) or frequent sensations of pressure, burning, pain, or other discomfort in the chest, arm, jaws, or stomach  Lightheadedness, dizziness, faint feeling, sweating, or difficulty breathing  Odd sensation changes like numbness, tingling, coldness, or pain in the arm or leg in which the catheter was inserted  Limb in which the catheter was inserted becomes pale/bluish in color    IMPORTANT:  Although this occurs very rarely, if you should develop bright red or excessive bleeding, feel a “pop” inside at the insertion site, or notice a sudden increase in swelling larger than a walnut, you should call 911.  Hold continuous firm pressure to the access site until emergency personnel arrive.  It is best if someone else can do this for you.

## 2024-04-16 NOTE — ANESTHESIA PREPROCEDURE EVALUATION
Anesthesia Evaluation     Patient summary reviewed and Nursing notes reviewed   no history of anesthetic complications:   NPO Solid Status: > 8 hours  NPO Liquid Status: > 8 hours           Airway   Mallampati: II  TM distance: >3 FB  Neck ROM: full  Dental    (+) edentulous    Pulmonary - normal exam   (+) a smoker Former,  Cardiovascular - normal exam    ECG reviewed    (+) hypertension, valvular problems/murmurs AI, CAD, hyperlipidemia    ROS comment: Interpretation Summary    Normal left and right ventricular size and systolic function.  Mild left ventricular concentric hypertrophy.  Indeterminate left ventricular diastolic function.  Dilated left atrium.  Moderate to severe eccentric aortic valve regurgitation.  Eccentric jet may lead to underestimation of aortic valve regurgitation.  Aortic root and proximal ascending aorta are not well-visualized.         Neuro/Psych- negative ROS  GI/Hepatic/Renal/Endo - negative ROS     Musculoskeletal (-) negative ROS    Abdominal    Substance History - negative use     OB/GYN          Other                      Anesthesia Plan    ASA 3     MAC   total IV anesthesia  intravenous induction     Anesthetic plan, risks, benefits, and alternatives have been provided, discussed and informed consent has been obtained with: patient.    Plan discussed with CRNA and CAA.    CODE STATUS:

## 2024-05-10 RX ORDER — LABETALOL 100 MG/1
100 TABLET, FILM COATED ORAL 2 TIMES DAILY
Qty: 180 TABLET | Refills: 1 | Status: SHIPPED | OUTPATIENT
Start: 2024-05-10

## 2024-05-21 ENCOUNTER — OFFICE VISIT (OUTPATIENT)
Dept: CARDIOLOGY | Facility: CLINIC | Age: 69
End: 2024-05-21
Payer: COMMERCIAL

## 2024-05-21 VITALS
OXYGEN SATURATION: 100 % | HEART RATE: 57 BPM | SYSTOLIC BLOOD PRESSURE: 138 MMHG | DIASTOLIC BLOOD PRESSURE: 63 MMHG | WEIGHT: 173 LBS | BODY MASS INDEX: 24.22 KG/M2 | HEIGHT: 71 IN

## 2024-05-21 DIAGNOSIS — I10 ESSENTIAL HYPERTENSION: ICD-10-CM

## 2024-05-21 DIAGNOSIS — I35.1 NONRHEUMATIC AORTIC VALVE INSUFFICIENCY: Primary | ICD-10-CM

## 2024-05-21 DIAGNOSIS — I71.21 ANEURYSM OF ASCENDING AORTA WITHOUT RUPTURE: ICD-10-CM

## 2024-05-21 RX ORDER — ROSUVASTATIN CALCIUM 40 MG/1
40 TABLET, COATED ORAL DAILY
Qty: 90 TABLET | Refills: 3 | Status: SHIPPED | OUTPATIENT
Start: 2024-05-21

## 2024-05-21 RX ORDER — LISINOPRIL 5 MG/1
2.5 TABLET ORAL DAILY
Qty: 90 TABLET | Refills: 3 | Status: SHIPPED | OUTPATIENT
Start: 2024-05-21

## 2024-05-21 RX ORDER — LABETALOL 100 MG/1
100 TABLET, FILM COATED ORAL 2 TIMES DAILY
Qty: 180 TABLET | Refills: 1 | Status: SHIPPED | OUTPATIENT
Start: 2024-05-21

## 2024-05-21 NOTE — PROGRESS NOTES
Subjective:     Encounter Date:05/21/2024      Patient ID: Hamlet Ortiz is a 69 y.o. male.    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  Was incidentally found on CT scans done for oral cancer management.     Used to smoke 1 pack a day however has quit for now.  Also mentions exercising a lot in the past and running marathons.  Overall has no limitations in functional status from cardiac standpoint.     11/7/2023-anxious about results, continues to jog and exercise every day without cardiac limitations.  Does not want cardiac catheterization for now.     12/19/2023-had an episode of viral infection last week which caused significant weakness and cough, tested himself for flu and COVID which were negative.  Has subsequently recovered.  He says his appetite is slowly building up.  Trying to get back to exercising and walking 5 miles which is his usual.  Continues to refuse cardiac catheterization.     3/12/2024-patient endorses dyspnea on exertion while walking up hills, decreased exercise tolerance from before and he attributes that to laziness however also endorses dyspnea on exertion with walking which was not the case a few months back.  Spoke extensively regarding need for cardiac catheterization given abnormal nuclear stress test, multiple risk factors and dyspnea on exertion, also recommend repeat echocardiogram to assess aortic insufficiency.  Will add lisinopril for afterload reduction.    5/21/2024-overall feels well, mentions walking and jogging every day  "without cardiovascular symptoms, coronary angiogram with Dr. Oneil revealed mild coronary artery disease, transesophageal echocardiogram showed moderate aortic valve insufficiency.  Blood pressure is relatively well-controlled, mentions occasional lightheadedness and dizziness when he gets up from a seated position, will decrease lisinopril to 2.5 mg.  Otherwise follow-up in 1 year along with echocardiogram and CT scan    Objective:         /63 (BP Location: Right arm, Patient Position: Sitting, Cuff Size: Adult)   Pulse 57   Ht 180.3 cm (71\")   Wt 78.5 kg (173 lb)   SpO2 100%   BMI 24.13 kg/m²     Physical exam  General-no acute distress  CVS-S1-S2 normal, no murmur  Respiratory-clear breath sounds  GI-soft nontender abdomen  No pedal edema  Alert oriented X3    ROS:  14 point review of systems negative except as mentioned above    Current Outpatient Medications:     labetalol (NORMODYNE) 100 MG tablet, Take 1 tablet by mouth 2 (Two) Times a Day., Disp: 180 tablet, Rfl: 1    lisinopril (PRINIVIL,ZESTRIL) 5 MG tablet, Take 0.5 tablets by mouth Daily., Disp: 90 tablet, Rfl: 3    rosuvastatin (CRESTOR) 40 MG tablet, Take 1 tablet by mouth Daily., Disp: 90 tablet, Rfl: 3    Problem List:  Patient Active Problem List   Diagnosis    Underweight    Squamous cell cancer of buccal mucosa    Prediabetes    Coronary artery disease involving native coronary artery of native heart without angina pectoris    Aneurysm of ascending aorta without rupture    Essential hypertension    Mixed hyperlipidemia    Nonrheumatic aortic valve insufficiency    Abnormal nuclear stress test     Past Medical History:  Past Medical History:   Diagnosis Date    Aneurysm of ascending aorta     Cancer 11-    Squamous Cell carcinoma    Coronary artery disease     Hyperlipidemia     Hypertension     Visual impairment June 2017    Macular degeneration     Past Surgical History:  Past Surgical History:   Procedure Laterality Date    " CARDIAC CATHETERIZATION N/A 2024    Procedure: Left Heart Cath;  Surgeon: Prince Oneil MD;  Location: [facility] CATH INVASIVE LOCATION;  Service: Cardiovascular;  Laterality: N/A;    HERNIA REPAIR  2018    LYMPH NODE BIOPSY  2022    sqamous cell cancer of mouth excised    MULTIPLE TOOTH EXTRACTIONS      all teeth removed 2022    SHOULDER SURGERY Left 2015    arthroscopic done     Social History:  Social History     Socioeconomic History    Marital status:    Tobacco Use    Smoking status: Former     Current packs/day: 0.00     Average packs/day: 1 pack/day for 30.9 years (30.9 ttl pk-yrs)     Types: Cigarettes     Start date: 1992     Quit date: 2022     Years since quittin.4     Passive exposure: Past    Smokeless tobacco: Former     Types: Chew     Quit date:    Vaping Use    Vaping status: Never Used   Substance and Sexual Activity    Alcohol use: Not Currently     Comment: recovering alcoholic. 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 (2022)    Drug use: Never    Sexual activity: Not Currently     Comment:      Allergies:  No Known Allergies  Immunizations:  Immunization History   Administered Date(s) Administered    ABRYSVO (RSV, 60+ or pregnant women 32-36 wks) 2023    COVID-19 (MODERNA) 1st,2nd,3rd Dose Monovalent 2021, 2021, 10/28/2021, 2022    COVID-19 (MODERNA) BIVALENT 12+YRS 2022    COVID-19 F23 (MODERNA) 12YRS+ (SPIKEVAX) 2023    Flu Vaccine Quad PF 6-35MO 2017, 10/09/2019    Fluzone High-Dose 65+yrs 2021, 2022, 2023    Influenza Quad Vaccine (Inpatient) 2020    Pneumococcal Conjugate 20-Valent (PCV20) 2023            In-Office Procedure(s):  No orders to display        ASCVD RIsk Score::  The ASCVD Risk score (Chele BROWNING, et al., 2019) failed to calculate for the following reasons:    The valid total cholesterol range is 130 to 320 mg/dL    Imaging:         Results for orders placed during the  hospital encounter of 09/05/23    CT Chest Without Contrast Diagnostic    Narrative  CT CHEST WO CONTRAST DIAGNOSTIC    Date of Exam: 9/5/2023 10:05 AM EDT    Indication: Aortic aneurysm, known or suspected.    Comparison: 2/2/2023    Technique: Axial CT images were obtained of the chest without contrast administration.  Sagittal and coronal reconstructions were performed.  Automated exposure control and iterative reconstruction methods were used.      Findings:  The ascending aorta is again noted to be aneurysmal, measuring a maximum of 4.5 cm at the mid ascending portion. Coronary and other vascular calcification is noted. Evaluation for mural hematoma or associated dissection is not possible on this  noncontrasted study. There are calcified lymph nodes in the mediastinum and right hilum, with no evidence of adenopathy in the thorax. Visualized thyroid is grossly unremarkable. Central airways are grossly patent. There is mild upper lung predominant  pulmonary emphysematous change. Evaluate patient history and risk factors to see if patient qualifies for low dose lung cancer screening. No suspicious pulmonary nodules or masses are identified. Limited imaging through the upper abdomen demonstrates a  grossly normal adrenal morphology. There is a small hiatal hernia. There is degenerative change in the spine. There are superior endplate central compression deformity at T10 which appears chronic.    Impression  Impression:    1. The ascending aorta is aneurysmal, measuring up to 4.5 cm, which is slightly increased as compared to prior (previously 4.3 cm).  2. Pulmonary emphysematous changes. Please see above.  3. Additional findings as ABOVE.      Electronically Signed: Nikolay Orantes MD  9/5/2023 1:56 PM EDT  Workstation ID: [id number]      Results for orders placed during the hospital encounter of 09/05/23    CT Chest Without Contrast Diagnostic    Narrative  CT CHEST WO CONTRAST DIAGNOSTIC    Date of Exam: 9/5/2023  10:05 AM EDT    Indication: Aortic aneurysm, known or suspected.    Comparison: 2/2/2023    Technique: Axial CT images were obtained of the chest without contrast administration.  Sagittal and coronal reconstructions were performed.  Automated exposure control and iterative reconstruction methods were used.      Findings:  The ascending aorta is again noted to be aneurysmal, measuring a maximum of 4.5 cm at the mid ascending portion. Coronary and other vascular calcification is noted. Evaluation for mural hematoma or associated dissection is not possible on this  noncontrasted study. There are calcified lymph nodes in the mediastinum and right hilum, with no evidence of adenopathy in the thorax. Visualized thyroid is grossly unremarkable. Central airways are grossly patent. There is mild upper lung predominant  pulmonary emphysematous change. Evaluate patient history and risk factors to see if patient qualifies for low dose lung cancer screening. No suspicious pulmonary nodules or masses are identified. Limited imaging through the upper abdomen demonstrates a  grossly normal adrenal morphology. There is a small hiatal hernia. There is degenerative change in the spine. There are superior endplate central compression deformity at T10 which appears chronic.    Impression  Impression:    1. The ascending aorta is aneurysmal, measuring up to 4.5 cm, which is slightly increased as compared to prior (previously 4.3 cm).  2. Pulmonary emphysematous changes. Please see above.  3. Additional findings as ABOVE.      Electronically Signed: Nikolay Orantes MD  9/5/2023 1:56 PM EDT  Workstation ID: [id number]        Most recent EKG as reviewed and interpreted by me:  Procedures     Most recent echo as reviewed and interpreted by me:  Results for orders placed during the hospital encounter of 04/16/24    Adult Transesophageal Echo (CHACHO) W/ Cont if Necessary Per Protocol    Interpretation Summary  Normal left and right ventricular size  and systolic function.  Agitated saline/bubble study did not reveal interatrial shunts.  Moderate central aortic valve insufficiency.  Aortic root is normal size.  Proximal ascending aorta is dilated measuring 4.4 cm.  Grade 2 atherosclerotic plaque in the descending thoracic aorta.      Most recent stress test as reviewed and interpreted by me:  Results for orders placed during the hospital encounter of 11/03/23    Stress Test With Myocardial Perfusion One Day    Interpretation Summary  1. Negative Regadenoson Electrocardiography: There is no ECG evidence of myocardial ischemia.  2.  Adequate blood-pressure response to regadenoson.  3. No regadenoson-induced arrhythmias  4.  Abnormal SPECT Myocardial Perfusion Imaging: There is a moderate sized moderate intensity perfusion defect in the entire inferior wall and mid inferolateral wall which is worse on stress, this is consistent with reginaldo-infarct ischemia.  5. Overall left ventricular function is low normal and no regional asynergy.      Most recent cardiac catheterization as reviewed interpreted by me:  Results for orders placed during the hospital encounter of 04/16/24    Cardiac Catheterization/Vascular Study    Conclusion  CARDIAC CATHETERIZATION REPORT      DATE OF PROCEDURE:  4/16/2024    INDICATION FOR PROCEDURE:    Abnormal stress test  Shortness of breath  Aortic regurgitation  Aortic aneurysm    PROCEDURE PERFORMED:    Ultrasound-guided access of femoral artery  Left heart catheterization  coronary angiography  left ventriculography    PROCEDURE COMMENTS:    After informed consent was obtained, the patient was prepped and draped in the usual sterile manner.  Mild to moderate sedation was administered.  Right femoral artery was accessed without difficulty under fluoroscopy and ultrasound guidance and 6 Serbian arterial sheath was inserted.  Sheath was flushed with heparinized saline.    Using 6 Serbian Nicola catheters, first left coronary artery and the  right coronary was electively engaged and appropriate views were taken.  A 6 Divehi JR4 catheter was used to cross aortic valve and left heart catheterization was performed.  Left ventriculography was done in a MENEZES    Mynx was used to close the femoral arteriotomy at the end of the procedure    Patient tolerated the procedure well.    FINDINGS:    1. HEMODYNAMICS:    Aortic pressure: 138/70 mmHg    LVEDP: 5 to 10 mmHg    Gradient across aortic valve on pullback: No gradient across aortic valve      2. LEFT VENTRICULOGRAPHY: 55%      3. CORONARY ANGIOGRAPHY:    A: Left main coronary artery: Left main was normal    B: Left anterior descending artery: Diffuse disease of LAD in the mid to distal segment.  No high-grade stenosis    C: Left circumflex coronary artery: 25 to 30% stenosis in the mid LCx which is a smaller system.  No high-grade stenosis    D: Right coronary artery: RCA is large and dominant no high-grade stenosis.  Diffuse disease in the mid to distal segment.    SUMMARY:    Mild coronary artery disease  Preserved left ventricular function    RECOMMENDATIONS:    Medical treatment      Assessment & Plan :           Ascending aortic aneurysm  Moderate eccentric aortic insufficiency  CT scan on 9/2023 showing ascending aortic aneurysm measuring 4.5 cm  Ultrasound abdomen did not show abdominal aortic aneurysm  Plan  - Strict blood pressure control-goal less than 130/80  - Continue lisinopril 2.5 mg daily   - Smoking cessation  - Continue labetalol twice daily  - Routine echocardiographic surveillance along with CT scan for moderate aortic insufficiency and ascending aortic aneurysm     Dyslipidemia  , HDL 35 and triglycerides 133 in January 2023  Repeat LDL in March 2024 is 50, at goal  Crestor 40 mg daily at bedtime     Abnormal nuclear stress test  Mild coronary artery disease coronary angiogram 4/2024 with Dr. Oneil  LDL at goal, currently on Crestor  Beta-blocker as above    Former smoker        Part  of this note may be an electronic transcription/translation of spoken language to printed text using the Dragon Dictation System.    Kami Rosario MD  05/21/24  .

## 2024-06-04 ENCOUNTER — HOSPITAL ENCOUNTER (OUTPATIENT)
Dept: CARDIOLOGY | Facility: HOSPITAL | Age: 69
Discharge: HOME | End: 2024-06-04
Admitting: STUDENT IN AN ORGANIZED HEALTH CARE EDUCATION/TRAINING PROGRAM
Payer: COMMERCIAL

## 2024-06-04 VITALS
BODY MASS INDEX: 24.23 KG/M2 | HEIGHT: 71 IN | SYSTOLIC BLOOD PRESSURE: 116 MMHG | DIASTOLIC BLOOD PRESSURE: 58 MMHG | WEIGHT: 173.06 LBS

## 2024-06-04 DIAGNOSIS — I35.1 NONRHEUMATIC AORTIC VALVE INSUFFICIENCY: ICD-10-CM

## 2024-06-04 LAB
BH CV ECHO MEAS - ACS: 1.79 CM
BH CV ECHO MEAS - AI P1/2T: 706.3 MSEC
BH CV ECHO MEAS - AO MAX PG: 8.9 MMHG
BH CV ECHO MEAS - AO MEAN PG: 4.8 MMHG
BH CV ECHO MEAS - AO ROOT DIAM: 3.6 CM
BH CV ECHO MEAS - AO V2 MAX: 148.6 CM/SEC
BH CV ECHO MEAS - AO V2 VTI: 35.3 CM
BH CV ECHO MEAS - AVA(I,D): 2.8 CM2
BH CV ECHO MEAS - EDV(CUBED): 171.1 ML
BH CV ECHO MEAS - EDV(MOD-SP4): 126 ML
BH CV ECHO MEAS - EF(MOD-BP): 55 %
BH CV ECHO MEAS - EF(MOD-SP4): 55 %
BH CV ECHO MEAS - ESV(CUBED): 61.2 ML
BH CV ECHO MEAS - ESV(MOD-SP4): 60.4 ML
BH CV ECHO MEAS - FS: 29 %
BH CV ECHO MEAS - IVS/LVPW: 1 CM
BH CV ECHO MEAS - IVSD: 1 CM
BH CV ECHO MEAS - LA DIMENSION: 3.9 CM
BH CV ECHO MEAS - LV DIASTOLIC VOL/BSA (35-75): 63.6 CM2
BH CV ECHO MEAS - LV MASS(C)D: 216.8 GRAMS
BH CV ECHO MEAS - LV MAX PG: 6 MMHG
BH CV ECHO MEAS - LV MEAN PG: 2.7 MMHG
BH CV ECHO MEAS - LV SYSTOLIC VOL/BSA (12-30): 30.5 CM2
BH CV ECHO MEAS - LV V1 MAX: 122.9 CM/SEC
BH CV ECHO MEAS - LV V1 VTI: 30 CM
BH CV ECHO MEAS - LVIDD: 5.6 CM
BH CV ECHO MEAS - LVIDS: 3.9 CM
BH CV ECHO MEAS - LVOT AREA: 3.3 CM2
BH CV ECHO MEAS - LVOT DIAM: 2.05 CM
BH CV ECHO MEAS - LVPWD: 1 CM
BH CV ECHO MEAS - MR MAX PG: 96.3 MMHG
BH CV ECHO MEAS - MR MAX VEL: 490.6 CM/SEC
BH CV ECHO MEAS - MV A MAX VEL: 68.2 CM/SEC
BH CV ECHO MEAS - MV DEC SLOPE: 187.5 CM/SEC2
BH CV ECHO MEAS - MV DEC TIME: 0.32 SEC
BH CV ECHO MEAS - MV E MAX VEL: 60 CM/SEC
BH CV ECHO MEAS - MV E/A: 0.88
BH CV ECHO MEAS - MV MAX PG: 2.17 MMHG
BH CV ECHO MEAS - MV MEAN PG: 1 MMHG
BH CV ECHO MEAS - MV V2 VTI: 33.4 CM
BH CV ECHO MEAS - MVA(VTI): 3 CM2
BH CV ECHO MEAS - PA ACC TIME: 0.11 SEC
BH CV ECHO MEAS - PA V2 MAX: 82.8 CM/SEC
BH CV ECHO MEAS - PI END-D VEL: 95.3 CM/SEC
BH CV ECHO MEAS - PULM A REVS DUR: 0.09 SEC
BH CV ECHO MEAS - PULM A REVS VEL: 29.2 CM/SEC
BH CV ECHO MEAS - PULM DIAS VEL: 39.9 CM/SEC
BH CV ECHO MEAS - PULM S/D: 1.2
BH CV ECHO MEAS - PULM SYS VEL: 48 CM/SEC
BH CV ECHO MEAS - RAP SYSTOLE: 3 MMHG
BH CV ECHO MEAS - RVSP: 26.4 MMHG
BH CV ECHO MEAS - SV(LVOT): 98.7 ML
BH CV ECHO MEAS - SV(MOD-SP4): 65.6 ML
BH CV ECHO MEAS - SVI(LVOT): 49.8 ML/M2
BH CV ECHO MEAS - SVI(MOD-SP4): 33.1 ML/M2
BH CV ECHO MEAS - TAPSE (>1.6): 2.5 CM
BH CV ECHO MEAS - TR MAX PG: 23.4 MMHG
BH CV ECHO MEAS - TR MAX VEL: 240.9 CM/SEC
BH CV XLRA - RV BASE: 3.8 CM
BH CV XLRA - RV MID: 2.3 CM

## 2024-06-04 PROCEDURE — 93306 TTE W/DOPPLER COMPLETE: CPT

## 2024-06-04 PROCEDURE — 93306 TTE W/DOPPLER COMPLETE: CPT | Performed by: STUDENT IN AN ORGANIZED HEALTH CARE EDUCATION/TRAINING PROGRAM

## 2024-09-06 ENCOUNTER — HOSPITAL ENCOUNTER (OUTPATIENT)
Dept: CT IMAGING | Facility: HOSPITAL | Age: 69
Discharge: HOME | End: 2024-09-06
Payer: COMMERCIAL

## 2024-09-06 ENCOUNTER — TELEPHONE (OUTPATIENT)
Dept: FAMILY MEDICINE CLINIC | Facility: CLINIC | Age: 69
End: 2024-09-06
Payer: COMMERCIAL

## 2024-09-06 DIAGNOSIS — Z87.891 PERSONAL HISTORY OF NICOTINE DEPENDENCE: ICD-10-CM

## 2024-09-06 DIAGNOSIS — R73.03 PREDIABETES: Primary | ICD-10-CM

## 2024-09-06 PROCEDURE — 71271 CT THORAX LUNG CANCER SCR C-: CPT

## 2024-09-06 NOTE — TELEPHONE ENCOUNTER
Spoke to pt 09/06/2024, 2:17 pm advised pt per Dr. Melton it is time to repeat his A1c, sill not need to fast

## 2025-02-04 RX ORDER — LABETALOL 100 MG/1
100 TABLET, FILM COATED ORAL 2 TIMES DAILY
Qty: 180 TABLET | Refills: 1 | Status: SHIPPED | OUTPATIENT
Start: 2025-02-04

## 2025-02-04 NOTE — TELEPHONE ENCOUNTER
"    Caller: Hamlet Ortiz \"Eriberto\"    Relationship: Self    Best call back number: [phone]    Requested Prescriptions:   Requested Prescriptions     Pending Prescriptions Disp Refills    labetalol (NORMODYNE) 100 MG tablet 180 tablet 1     Sig: Take 1 tablet by mouth 2 (Two) Times a Day.        Pharmacy where request should be sent: [id number] - [place], [place] - [address] - [phone]  - [phone] FX     Last office visit with prescribing clinician: Visit date not found   Last telemedicine visit with prescribing clinician: Visit date not found   Next office visit with prescribing clinician: 5/16/2025     Additional details provided by patient:     Does the patient have less than a 3 day supply:  [x] Yes  [] No    Would you like a call back once the refill request has been completed: [] Yes [x] No    If the office needs to give you a call back, can they leave a voicemail: [x] Yes [] No    Miroslava Mcdowell Rep   02/04/25 09:21 EST           "

## 2025-03-03 ENCOUNTER — OFFICE VISIT (OUTPATIENT)
Dept: FAMILY MEDICINE CLINIC | Facility: CLINIC | Age: 70
End: 2025-03-03
Payer: COMMERCIAL

## 2025-03-03 VITALS
BODY MASS INDEX: 24.81 KG/M2 | SYSTOLIC BLOOD PRESSURE: 136 MMHG | RESPIRATION RATE: 16 BRPM | TEMPERATURE: 98.9 F | HEART RATE: 81 BPM | DIASTOLIC BLOOD PRESSURE: 70 MMHG | HEIGHT: 71 IN | OXYGEN SATURATION: 97 % | WEIGHT: 177.2 LBS

## 2025-03-03 DIAGNOSIS — Z00.00 MEDICARE ANNUAL WELLNESS VISIT, SUBSEQUENT: Primary | ICD-10-CM

## 2025-03-03 DIAGNOSIS — E78.2 MIXED HYPERLIPIDEMIA: ICD-10-CM

## 2025-03-03 DIAGNOSIS — T88.7XXA DRUG SIDE EFFECTS: ICD-10-CM

## 2025-03-03 DIAGNOSIS — R73.03 PREDIABETES: ICD-10-CM

## 2025-03-03 DIAGNOSIS — Z12.11 COLON CANCER SCREENING: ICD-10-CM

## 2025-03-03 PROCEDURE — 3078F DIAST BP <80 MM HG: CPT | Performed by: STUDENT IN AN ORGANIZED HEALTH CARE EDUCATION/TRAINING PROGRAM

## 2025-03-03 PROCEDURE — G0439 PPPS, SUBSEQ VISIT: HCPCS | Performed by: STUDENT IN AN ORGANIZED HEALTH CARE EDUCATION/TRAINING PROGRAM

## 2025-03-03 PROCEDURE — 3075F SYST BP GE 130 - 139MM HG: CPT | Performed by: STUDENT IN AN ORGANIZED HEALTH CARE EDUCATION/TRAINING PROGRAM

## 2025-03-03 PROCEDURE — 1126F AMNT PAIN NOTED NONE PRSNT: CPT | Performed by: STUDENT IN AN ORGANIZED HEALTH CARE EDUCATION/TRAINING PROGRAM

## 2025-03-03 NOTE — PROGRESS NOTES
Subjective   The ABCs of the Annual Wellness Visit  Medicare Wellness Visit      Hamlet Ortiz is a 69 y.o. patient who presents for a Medicare Wellness Visit.    The following portions of the patient's history were reviewed and   updated as appropriate: allergies, current medications, past family history, past medical history, past social history, past surgical history, and problem list.    Compared to one year ago, the patient's physical   health is the same.  Compared to one year ago, the patient's mental   health is better.    Recent Hospitalizations:  He was not admitted to the hospital during the last year.     Current Medical Providers:  Patient Care Team:  Nicky Melton DO as PCP - General (Family Medicine)  Carol Salazar MD (Otolaryngology)  Kushal King MD (Otolaryngology)  Prince Oneil MD as Consulting Physician (Cardiology)    Outpatient Medications Prior to Visit   Medication Sig Dispense Refill    labetalol (NORMODYNE) 100 MG tablet Take 1 tablet by mouth 2 (Two) Times a Day. 180 tablet 1    lisinopril (PRINIVIL,ZESTRIL) 5 MG tablet Take 0.5 tablets by mouth Daily. 90 tablet 3    rosuvastatin (CRESTOR) 40 MG tablet Take 1 tablet by mouth Daily. 90 tablet 3     No facility-administered medications prior to visit.     No opioid medication identified on active medication list. I have reviewed chart for other potential  high risk medication/s and harmful drug interactions in the elderly.      Aspirin is not on active medication list.  Aspirin use is not indicated based on review of current medical condition/s. Risk of harm outweighs potential benefits.  .    Patient Active Problem List   Diagnosis    Underweight    Squamous cell cancer of buccal mucosa    Prediabetes    Coronary artery disease involving native coronary artery of native heart without angina pectoris    Aneurysm of ascending aorta without rupture    Essential hypertension    Mixed hyperlipidemia    Nonrheumatic aortic valve  "insufficiency    Abnormal nuclear stress test     Advance Care Planning Advance Directive is not on file.  ACP discussion was held with the patient during this visit. Has a power of  and living will but declines to provide a copy       Objective   Vitals:    25 0947   BP: 136/70   BP Location: Left arm   Patient Position: Sitting   Cuff Size: Large Adult   Pulse: 81   Resp: 16   Temp: 98.9 °F (37.2 °C)   TempSrc: Skin   SpO2: 97%   Weight: 80.4 kg (177 lb 3.2 oz)   Height: 180.3 cm (71\")       Estimated body mass index is 24.71 kg/m² as calculated from the following:    Height as of this encounter: 180.3 cm (71\").    Weight as of this encounter: 80.4 kg (177 lb 3.2 oz).    BMI is within normal parameters. No other follow-up for BMI required.           Does the patient have evidence of cognitive impairment? No  - ACE III minico/30                                                                                        Health  Risk Assessment    Smoking Status:  Social History     Tobacco Use   Smoking Status Former    Current packs/day: 0.00    Average packs/day: 1 pack/day for 30.9 years (30.9 ttl pk-yrs)    Types: Cigarettes    Start date: 1992    Quit date: 2022    Years since quittin.2    Passive exposure: Past   Smokeless Tobacco Former    Types: Chew    Quit date:      Alcohol Consumption:  Social History     Substance and Sexual Activity   Alcohol Use Not Currently    Comment: recovering alcoholic. 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 (2022)       Fall Risk Screen  STEADI Fall Risk Assessment was completed, and patient is at LOW risk for falls.Assessment completed on:3/3/2025    Depression Screening   Little interest or pleasure in doing things? Not at all   Feeling down, depressed, or hopeless? Not at all   PHQ-2 Total Score 0   Trouble falling or staying asleep, or sleeping too much? Not at all   Feeling tired or having little energy? Not at all   Poor appetite or overeating? Not at " all   Feeling bad about yourself - or that you are a failure or have let yourself or your family down? Not at all   Trouble concentrating on things, such as reading the newspaper or watching television? Not at all   Moving or speaking so slowly that other people could have noticed? Or the opposite - being so fidgety or restless that you have been moving around a lot more than usual? Not at all   Thoughts that you would be better off dead, or of hurting yourself in some way? Not at all   PHQ-9 Total Score 0   If you checked off any problems, how difficult have these problems made it for you to do your work, take care of things at home, or get along with other people? Not difficult at all      Health Habits and Functional and Cognitive Screenin/24/2025     7:39 AM   Functional & Cognitive Status   Do you have difficulty preparing food and eating? No    Do you have difficulty bathing yourself, getting dressed or grooming yourself? No    Do you have difficulty using the toilet? No    Do you have difficulty moving around from place to place? No    Do you have trouble with steps or getting out of a bed or a chair? No    Current Diet Well Balanced Diet    Dental Exam Up to date    Eye Exam Not up to date    Exercise (times per week) 4 times per week    Current Exercises Include Walking;Yard Work    Do you need help using the phone?  No    Are you deaf or do you have serious difficulty hearing?  No    Do you need help to go to places out of walking distance? No    Do you need help shopping? No    Do you need help preparing meals?  No    Do you need help with housework?  No    Do you need help with laundry? No    Do you need help taking your medications? No    Do you need help managing money? No    Do you ever drive or ride in a car without wearing a seat belt? No    Have you felt unusual stress, anger or loneliness in the last month? No    Who do you live with? Alone    If you need help, do you have trouble finding  someone available to you? No    Have you been bothered in the last four weeks by sexual problems? No    Do you have difficulty concentrating, remembering or making decisions? No        Patient-reported           Age-appropriate Screening Schedule:  Refer to the list below for future screening recommendations based on patient's age, sex and/or medical conditions. Orders for these recommended tests are listed in the plan section. The patient has been provided with a written plan.    Health Maintenance List  Health Maintenance   Topic Date Due    TDAP/TD VACCINES (1 - Tdap) Never done    COLORECTAL CANCER SCREENING  Never done    INFLUENZA VACCINE  07/01/2024    COVID-19 Vaccine (7 - 2024-25 season) 09/01/2024    LIPID PANEL  03/01/2025    ZOSTER VACCINE (1 of 2) 03/03/2026 (Originally 5/3/2003)    LUNG CANCER SCREENING  09/06/2025    ANNUAL WELLNESS VISIT  03/03/2026    HEPATITIS C SCREENING  Completed    Pneumococcal Vaccine 50+  Completed    AAA SCREEN ONCE  Completed                                                                                                                                                Physical Exam  Constitutional:       General: He is not in acute distress.  HENT:      Head: Normocephalic and atraumatic.      Right Ear: Tympanic membrane normal.      Left Ear: Tympanic membrane normal.      Nose: Nose normal.      Mouth/Throat:      Mouth: Mucous membranes are moist.      Pharynx: Oropharynx is clear. No posterior oropharyngeal erythema.   Eyes:      Extraocular Movements: Extraocular movements intact.      Conjunctiva/sclera: Conjunctivae normal.   Neck:      Comments: - Thyroid not enlarged  Cardiovascular:      Rate and Rhythm: Normal rate and regular rhythm.      Heart sounds: Normal heart sounds.   Pulmonary:      Effort: Pulmonary effort is normal.      Breath sounds: Normal breath sounds. No stridor. No wheezing.   Abdominal:      General: Abdomen is flat. Bowel sounds are normal.       Palpations: Abdomen is soft. There is no mass.      Tenderness: There is no abdominal tenderness. There is no guarding.   Musculoskeletal:         General: No swelling or deformity. Normal range of motion.      Cervical back: Normal range of motion and neck supple.   Skin:     General: Skin is warm and dry.      Capillary Refill: Capillary refill takes less than 2 seconds.      Coloration: Skin is not jaundiced.      Findings: No rash.   Neurological:      General: No focal deficit present.      Mental Status: He is alert and oriented to person, place, and time.      Cranial Nerves: No cranial nerve deficit.      Motor: No weakness.      Coordination: Coordination normal.      Gait: Gait normal.      Deep Tendon Reflexes: Reflexes normal.   Psychiatric:         Mood and Affect: Mood normal.         Behavior: Behavior normal.         Thought Content: Thought content normal.         Diagnoses and all orders for this visit:    1. Medicare annual wellness visit, subsequent (Primary)  -Patient declines to fill out ACP paperwork or submit copy of power of  or living well as he is concerned about security issues should the system get hacked  -Overall normal 69-year-old male exam  -Pertinent screening labs ordered per below  -Last chest CT due to smoking history was done on 9/6/2024, too early to order  -Patient due for colonoscopy, ordered per below  -Offered Shingrix vaccine, but pt declines    2. Drug side effects  -     CBC & Differential  -     Comprehensive metabolic panel    3. Prediabetes  -     Hemoglobin A1c    4. Mixed hyperlipidemia  -     Lipid panel    5. Colon cancer screening  - On 3/2/2023, he stated his last colonoscopy was about 15 years ago and was agreeable to colonoscopy but had not gotten one scheduled.     -  Ambulatory Referral For Screening Colonoscopy: Dr Nathan Navarro    6. BMI 24.0-24.9, adult  BMI is within normal parameters. No other follow-up for BMI required.           Follow Up  - 1  year for annual wellness exam

## 2025-03-04 LAB
ALBUMIN SERPL-MCNC: 4.8 G/DL (ref 3.8–4.8)
ALP SERPL-CCNC: 57 IU/L (ref 44–121)
ALT SERPL-CCNC: 23 IU/L (ref 0–44)
AST SERPL-CCNC: 22 IU/L (ref 0–40)
BASOPHILS # BLD AUTO: 0.1 X10E3/UL (ref 0–0.2)
BASOPHILS NFR BLD AUTO: 1 %
BILIRUB SERPL-MCNC: 0.5 MG/DL (ref 0–1.2)
BUN SERPL-MCNC: 16 MG/DL (ref 8–27)
BUN/CREAT SERPL: 16 (ref 10–24)
CALCIUM SERPL-MCNC: 9.8 MG/DL (ref 8.6–10.2)
CHLORIDE SERPL-SCNC: 102 MMOL/L (ref 96–106)
CHOLEST SERPL-MCNC: 126 MG/DL (ref 100–199)
CO2 SERPL-SCNC: 23 MMOL/L (ref 20–29)
CREAT SERPL-MCNC: 1.03 MG/DL (ref 0.76–1.27)
EGFRCR SERPLBLD CKD-EPI 2021: 78 ML/MIN/1.73
EOSINOPHIL # BLD AUTO: 0.1 X10E3/UL (ref 0–0.4)
EOSINOPHIL NFR BLD AUTO: 2 %
ERYTHROCYTE [DISTWIDTH] IN BLOOD BY AUTOMATED COUNT: 12.4 % (ref 11.6–15.4)
GLOBULIN SER CALC-MCNC: 2.1 G/DL (ref 1.5–4.5)
GLUCOSE SERPL-MCNC: 122 MG/DL (ref 70–99)
HBA1C MFR BLD: 6.4 % (ref 4.8–5.6)
HCT VFR BLD AUTO: 46.7 % (ref 37.5–51)
HDLC SERPL-MCNC: 47 MG/DL
HGB BLD-MCNC: 15.5 G/DL (ref 13–17.7)
IMM GRANULOCYTES # BLD AUTO: 0 X10E3/UL (ref 0–0.1)
IMM GRANULOCYTES NFR BLD AUTO: 0 %
LDLC SERPL CALC-MCNC: 60 MG/DL (ref 0–99)
LYMPHOCYTES # BLD AUTO: 1.6 X10E3/UL (ref 0.7–3.1)
LYMPHOCYTES NFR BLD AUTO: 31 %
MCH RBC QN AUTO: 30.5 PG (ref 26.6–33)
MCHC RBC AUTO-ENTMCNC: 33.2 G/DL (ref 31.5–35.7)
MCV RBC AUTO: 92 FL (ref 79–97)
MONOCYTES # BLD AUTO: 0.6 X10E3/UL (ref 0.1–0.9)
MONOCYTES NFR BLD AUTO: 11 %
NEUTROPHILS # BLD AUTO: 2.8 X10E3/UL (ref 1.4–7)
NEUTROPHILS NFR BLD AUTO: 55 %
PLATELET # BLD AUTO: 254 X10E3/UL (ref 150–450)
POTASSIUM SERPL-SCNC: 4.9 MMOL/L (ref 3.5–5.2)
PROT SERPL-MCNC: 6.9 G/DL (ref 6–8.5)
RBC # BLD AUTO: 5.09 X10E6/UL (ref 4.14–5.8)
SODIUM SERPL-SCNC: 137 MMOL/L (ref 134–144)
TRIGL SERPL-MCNC: 102 MG/DL (ref 0–149)
VLDLC SERPL CALC-MCNC: 19 MG/DL (ref 5–40)
WBC # BLD AUTO: 5.1 X10E3/UL (ref 3.4–10.8)

## 2025-03-07 ENCOUNTER — TELEPHONE (OUTPATIENT)
Dept: FAMILY MEDICINE CLINIC | Facility: CLINIC | Age: 70
End: 2025-03-07
Payer: COMMERCIAL

## 2025-03-07 DIAGNOSIS — R73.03 PREDIABETES: Primary | ICD-10-CM

## 2025-03-07 RX ORDER — METFORMIN HYDROCHLORIDE 500 MG/1
500 TABLET, EXTENDED RELEASE ORAL
Qty: 90 TABLET | Refills: 2 | Status: SHIPPED | OUTPATIENT
Start: 2025-03-07

## 2025-03-07 NOTE — TELEPHONE ENCOUNTER
"Hamlet Ortiz \"Eriberto\"  [facility]  Clinical Pool (supporting You)14 hours ago (5:12 PM)       Shilpi, Please advise Dr Melton I received her advice and have chosen to follow her recommendation of starting metformin ER 500mg as well as the increased physical exercise and dramatically reduced my carbs and sugar intake.   Thank you both and have a very good rest of your day.       You  Hamlet Ortiz \"Eriberto\"16 hours ago (3:05 PM)     CALIXTO Wei!  Please advise the results below per Dr. Melton,  \"Patient's red and white cells, electrolytes, kidney function and liver enzymes have come back normal.  Cholesterol has come back in normal range.    Patient's A1c (or diabetes screen) has come back in the prediabetic range but at 6.4%.  6.5% higher is considered the diabetic range.  Would still encourage decreasing amount of sugar and carbs in the diet and increasing regular aerobic physical activity. However, at this point would recommend starting metformin  mg daily with a meal.  Let me know if patient is agreeable to starting medication.  If not, we can recheck in 3 months\".     Thanks  Shilpi SHEFFIELD  "

## 2025-03-10 ENCOUNTER — PATIENT MESSAGE (OUTPATIENT)
Dept: FAMILY MEDICINE CLINIC | Facility: CLINIC | Age: 70
End: 2025-03-10
Payer: COMMERCIAL

## 2025-05-15 NOTE — PROGRESS NOTES
Date of Office Visit: 2025  Encounter Provider: Dr. Prince Oneil  Place of Service: [facility] CARDIOLOG[facility] [place]  Patient Name: Hamlet Ortiz  :1955  Nicky Melton DO    Chief Complaint   Patient presents with    Coronary Artery Disease    Hypertension    Hyperlipidemia    Follow-up     History of Present Illness:    I am pleased to see Mr. Carr in my office today as a new  follow-up.    As you know, patient is 70 years old white gentleman whose past medical history significant for hypertension, diabetes mellitus, hyperlipidemia, aortic aneurysm, aortic regurgitation, who came today to establish care with me.  Previously patient has seen Dr. Rosario.    In 2023, patient was diagnosed with squamous cell carcinoma of mouth and underwent surgery.  Perioperatively patient was noted to have aortic regurgitation with aortic root dilatation.  Patient underwent CAT scan of the chest which showed aortic aneurysm of ascending aorta with size of 4.5.  Patient underwent stress test which showed inferior wall ischemia.  Patient underwent CHACHO which showed moderate aortic regurgitation.  Patient underwent cardiac catheterization which showed mild coronary artery disease.    Patient came today for follow-up.  Patient denies any symptom of chest pain or tightness or heaviness.  Patient denies any orthopnea PND no syncope or presyncope noted.    EKG showed normal sinus rhythm.    At this stage, I would recommend that patient continue current treatment blood pressure is controlled I would consider CT scan next year instead of this year.  Echocardiogram would be repeated.      Past Medical History:   Diagnosis Date    Aneurysm of ascending aorta     Cancer 11-    Squamous Cell carcinoma    Coronary artery disease     Hyperlipidemia     Hypertension     Visual impairment 2017    Macular degeneration         Past Surgical History:   Procedure Laterality Date    CARDIAC CATHETERIZATION N/A  2024    Procedure: Left Heart Cath;  Surgeon: Prince Oneil MD;  Location: [facility] CATH INVASIVE LOC[facility];  Service: Cardiovascular;  Laterality: N/A;    HERNIA REPAIR  2018    left inguinal groove    LYMPH NODE BIOPSY  2022    sqamous cell cancer of mouth excised    MULTIPLE TOOTH EXTRACTIONS  2022    all teeth removed    SHOULDER SURGERY Left 2015    arthroscopic done           Current Outpatient Medications:     labetalol (NORMODYNE) 100 MG tablet, Take 1 tablet by mouth 2 (Two) Times a Day., Disp: 180 tablet, Rfl: 1    lisinopril (PRINIVIL,ZESTRIL) 5 MG tablet, Take 0.5 tablets by mouth Daily., Disp: 90 tablet, Rfl: 3    metFORMIN ER (GLUCOPHAGE-XR) 500 MG 24 hr tablet, Take 1 tablet by mouth Daily With Breakfast., Disp: 90 tablet, Rfl: 2    rosuvastatin (CRESTOR) 40 MG tablet, Take 1 tablet by mouth Daily., Disp: 90 tablet, Rfl: 3      Social History     Socioeconomic History    Marital status:    Tobacco Use    Smoking status: Former     Current packs/day: 0.00     Average packs/day: 1 pack/day for 30.9 years (30.9 ttl pk-yrs)     Types: Cigarettes     Start date: 1992     Quit date: 2022     Years since quittin.4     Passive exposure: Past    Smokeless tobacco: Former     Types: Chew     Quit date:    Vaping Use    Vaping status: Never Used   Substance and Sexual Activity    Alcohol use: Not Currently     Comment: recovering alcoholic. 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 (2022)    Drug use: Never    Sexual activity: Not Currently     Comment:          Review of Systems   Constitutional: Negative for chills and fever.   HENT:  Negative for ear discharge and nosebleeds.    Eyes:  Negative for discharge and redness.   Cardiovascular:  Negative for chest pain, orthopnea, palpitations, paroxysmal nocturnal dyspnea and syncope.   Respiratory:  Negative for cough, shortness of breath and wheezing.    Endocrine: Negative for heat intolerance.   Skin:  Negative for rash.  "  Musculoskeletal:  Negative for arthritis and myalgias.   Gastrointestinal:  Negative for abdominal pain, melena, nausea and vomiting.   Genitourinary:  Negative for dysuria and hematuria.   Neurological:  Negative for dizziness, light-headedness, numbness and tremors.   Psychiatric/Behavioral:  Negative for depression. The patient is not nervous/anxious.        Procedures      ECG 12 Lead    Date/Time: 5/16/2025 10:22 AM  Performed by: Prince Oneil MD    Authorized by: Prince Oneil MD  Comparison: compared with previous ECG   Similar to previous ECG  Rhythm: sinus rhythm    Clinical impression: normal ECG          ECG 12 Lead    (Results Pending)           Objective:    /75 (BP Location: Right arm, Patient Position: Sitting, Cuff Size: Large Adult)   Pulse 76   Resp 16   Ht 180 cm (70.87\")   Wt 79.4 kg (175 lb)   SpO2 96%   BMI 24.50 kg/m²         Constitutional:       Appearance: Well-developed.   Eyes:      General: No scleral icterus.        Right eye: No discharge.   HENT:      Head: Normocephalic and atraumatic.   Neck:      Thyroid: No thyromegaly.      Lymphadenopathy: No cervical adenopathy.   Pulmonary:      Effort: Pulmonary effort is normal. No respiratory distress.      Breath sounds: Normal breath sounds. No wheezing. No rales.   Cardiovascular:      Normal rate. Regular rhythm.      No gallop.    Edema:     Peripheral edema absent.   Abdominal:      Tenderness: There is no abdominal tenderness.   Skin:     Findings: No erythema or rash.   Neurological:      Mental Status: Alert and oriented to person, place, and time.             Assessment:       Diagnosis Plan   1. Essential hypertension  ECG 12 Lead      2. Mixed hyperlipidemia  ECG 12 Lead      3. Nonrheumatic aortic valve insufficiency  ECG 12 Lead      4. Coronary artery disease involving native coronary artery of native heart without angina pectoris        5. Aneurysm of ascending aorta without rupture                 Plan:     "   MDM:    1.  Aortic aneurysm:    I would not do CT scan this year as aortic aneurysm has been stable I would repeat that next year.    2.  Aortic regurgitation:    Repeat echocardiogram in 1 year    3.  Hypertension:    Blood pressure is very well-controlled current treatment would be continued    4.  CAD:    Patient has nonobstructive CAD risk factor modification recommended    5.  Mixed hyperlipidemia:    Patient is on Crestor repeat lipid panel testing

## 2025-05-16 ENCOUNTER — OFFICE VISIT (OUTPATIENT)
Dept: CARDIOLOGY | Facility: CLINIC | Age: 70
End: 2025-05-16
Payer: COMMERCIAL

## 2025-05-16 VITALS
HEART RATE: 76 BPM | SYSTOLIC BLOOD PRESSURE: 126 MMHG | OXYGEN SATURATION: 96 % | HEIGHT: 71 IN | BODY MASS INDEX: 24.5 KG/M2 | RESPIRATION RATE: 16 BRPM | WEIGHT: 175 LBS | DIASTOLIC BLOOD PRESSURE: 75 MMHG

## 2025-05-16 DIAGNOSIS — I25.10 CORONARY ARTERY DISEASE INVOLVING NATIVE CORONARY ARTERY OF NATIVE HEART WITHOUT ANGINA PECTORIS: ICD-10-CM

## 2025-05-16 DIAGNOSIS — I10 ESSENTIAL HYPERTENSION: Primary | ICD-10-CM

## 2025-05-16 DIAGNOSIS — E78.2 MIXED HYPERLIPIDEMIA: ICD-10-CM

## 2025-05-16 DIAGNOSIS — I71.21 ANEURYSM OF ASCENDING AORTA WITHOUT RUPTURE: ICD-10-CM

## 2025-05-16 DIAGNOSIS — I35.1 NONRHEUMATIC AORTIC VALVE INSUFFICIENCY: ICD-10-CM

## 2025-06-10 ENCOUNTER — TELEPHONE (OUTPATIENT)
Dept: FAMILY MEDICINE CLINIC | Facility: CLINIC | Age: 70
End: 2025-06-10
Payer: COMMERCIAL

## 2025-06-10 DIAGNOSIS — R73.03 PREDIABETES: Primary | ICD-10-CM

## 2025-06-10 NOTE — TELEPHONE ENCOUNTER
Started metformin on 3/3/2025 for A1c of 6.4%.  Please let patient know that repeat A1c lab is available be done to see if this is keeping his A1c under better control

## 2025-06-11 LAB — HBA1C MFR BLD: 5.7 % (ref 4.8–5.6)

## 2025-06-12 ENCOUNTER — RESULTS FOLLOW-UP (OUTPATIENT)
Dept: FAMILY MEDICINE CLINIC | Facility: CLINIC | Age: 70
End: 2025-06-12
Payer: COMMERCIAL

## 2025-06-12 ENCOUNTER — PREP FOR SURGERY (OUTPATIENT)
Age: 70
End: 2025-06-12
Payer: COMMERCIAL

## 2025-06-12 DIAGNOSIS — R73.03 PREDIABETES: ICD-10-CM

## 2025-06-12 DIAGNOSIS — Z12.11 ENCOUNTER FOR SCREENING COLONOSCOPY: Primary | ICD-10-CM

## 2025-06-12 RX ORDER — SODIUM CHLORIDE 9 MG/ML
30 INJECTION, SOLUTION INTRAVENOUS CONTINUOUS PRN
OUTPATIENT
Start: 2025-06-12 | End: 2025-06-13

## 2025-06-18 RX ORDER — LISINOPRIL 5 MG/1
2.5 TABLET ORAL DAILY
Qty: 45 TABLET | Refills: 3 | Status: SHIPPED | OUTPATIENT
Start: 2025-06-18

## 2025-06-18 NOTE — TELEPHONE ENCOUNTER
"Caller: Hamlet Ortiz \"Eriberto\"    Relationship: Self    Best call back number: [phone]     Requested Prescriptions:   Requested Prescriptions     Pending Prescriptions Disp Refills    lisinopril (PRINIVIL,ZESTRIL) 5 MG tablet 90 tablet 3     Sig: Take 0.5 tablets by mouth Daily.        Pharmacy where request should be sent: [id number] - [place], [place] [address] - [phone] [facility] [phone] FX     Last office visit with prescribing clinician: 5/16/2025   Last telemedicine visit with prescribing clinician: Visit date not found   Next office visit with prescribing clinician: 5/15/2026     Additional details provided by patient: PT CALLING IN TO GET HIS NEXT YEAR OF REFILLS WITH 90 DAY REFILLS - HE HAD LAST VISIT IN MAY AND IS SCHEDULED FOR 2026 - PT HAS 4 DAYS OF MEDICINE -    Does the patient have less than a 3 day supply:  [] Yes  [x] No    Miroslava Hough Rep   06/18/25 08:54 EDT       "

## 2025-07-21 ENCOUNTER — ANESTHESIA EVENT (OUTPATIENT)
Dept: GASTROENTEROLOGY | Facility: HOSPITAL | Age: 70
End: 2025-07-21
Payer: COMMERCIAL

## 2025-07-21 NOTE — ANESTHESIA PREPROCEDURE EVALUATION
Anesthesia Evaluation     Patient summary reviewed and Nursing notes reviewed   no history of anesthetic complications:   NPO Solid Status: > 8 hours  NPO Liquid Status: > 2 hours           Airway   Dental      Pulmonary    (+) a smoker Former,  Cardiovascular     ECG reviewed  Patient on routine beta blocker    (+) hypertension, valvular problems/murmurs AI and TI, CAD, hyperlipidemia      Neuro/Psych  GI/Hepatic/Renal/Endo      Musculoskeletal     Abdominal    Substance History      OB/GYN          Other      history of cancer    ROS/Med Hx Other: Additional History:  SCC buccal mucosa, ascending aorta aneurysm, pre-DM    Echo:    Echocardiogram Findings    Left Ventricle Left ventricular systolic function is normal. Calculated left ventricular EF = 55% Left ventricular ejection fraction appears to be 51 - 55%.     Normal left ventricular cavity size and wall thickness noted. All left ventricular wall segments contract normally. Left ventricular diastolic function is consistent with (grade I) impaired relaxation.  Right Ventricle Normal right ventricular cavity size and systolic function noted.  Left Atrium The left atrial cavity is borderline dilated.  Right Atrium Normal right atrial cavity size noted.  Aortic Valve The aortic valve is structurally normal. The aortic valve appears trileaflet. Mild to moderate aortic valve regurgitation is present. No hemodynamically significant aortic valve stenosis is present.  Mitral Valve The mitral valve is structurally normal with no significant stenosis present. No significant mitral valve regurgitation is present.  Tricuspid Valve The tricuspid valve is structurally normal with no significant stenosis present. Mild tricuspid valve regurgitation is present. Estimated right ventricular systolic pressure from tricuspid regurgitation is normal (<35 mmHg). No evidence of pulmonary hypertension is present.  Pulmonic Valve The pulmonic valve is not well visualized. There is no  significant pulmonic valve regurgitation present. There is no pulmonic valve stenosis present.  Pericardium There is no evidence of pericardial effusion. .        Stress Test:  1. Negative Regadenoson Electrocardiography: There is no ECG evidence of myocardial ischemia.  2.  Adequate blood-pressure response to regadenoson.  3. No regadenoson-induced arrhythmias  4.  Abnormal SPECT Myocardial Perfusion Imaging: There is a moderate sized moderate intensity perfusion defect in the entire inferior wall and mid inferolateral wall which is worse on stress, this is consistent with reginaldo-infarct ischemia.  5. Overall left ventricular function is low normal and no regional asynergy.      Cath:  FINDINGS:     1. HEMODYNAMICS:       Aortic pressure: 138/70 mmHg     LVEDP: 5 to 10 mmHg     Gradient across aortic valve on pullback: No gradient across aortic valve        2. LEFT VENTRICULOGRAPHY: 55%        3. CORONARY ANGIOGRAPHY:            A: Left main coronary artery: Left main was normal            B: Left anterior descending artery: Diffuse disease of LAD in the mid to distal segment.  No high-grade stenosis            C: Left circumflex coronary artery: 25 to 30% stenosis in the mid LCx which is a smaller system.  No high-grade stenosis            D: Right coronary artery: RCA is large and dominant no high-grade stenosis.  Diffuse disease in the mid to distal segment.     SUMMARY:      Mild coronary artery disease  Preserved left ventricular function     RECOMMENDATIONS:      Medical treatment      PSH:  SHOULDER SURGERY HERNIA REPAIR  MULTIPLE TOOTH EXTRACTIONS LYMPH NODE BIOPSY  CARDIAC CATHETERIZATION                 Anesthesia Plan    ASA 3     general   total IV anesthesia  (Patient identified; pre-operative vital signs, all relevant labs/studies, complete medical/surgical/anesthetic history, full medication list, full allergy list, and NPO status obtained/reviewed; physical assessment performed; anesthetic options,  side effects, potential complications, risks, and benefits discussed; questions answered; verbal and/or written anesthesia consent obtained; patient cleared for procedure; anesthesia machine and equipment checked and functioning)  intravenous induction     Anesthetic plan, risks, benefits, and alternatives have been provided, discussed and informed consent has been obtained with: patient.    Use of blood products discussed with  Consented to blood products.    Plan discussed with CRNA and CAA.    CODE STATUS:

## 2025-07-23 ENCOUNTER — HOSPITAL ENCOUNTER (OUTPATIENT)
Facility: HOSPITAL | Age: 70
Setting detail: HOSPITAL OUTPATIENT SURGERY
Discharge: HOME | End: 2025-07-23
Attending: STUDENT IN AN ORGANIZED HEALTH CARE EDUCATION/TRAINING PROGRAM | Admitting: STUDENT IN AN ORGANIZED HEALTH CARE EDUCATION/TRAINING PROGRAM
Payer: COMMERCIAL

## 2025-07-23 ENCOUNTER — ANESTHESIA (OUTPATIENT)
Dept: GASTROENTEROLOGY | Facility: HOSPITAL | Age: 70
End: 2025-07-23
Payer: COMMERCIAL

## 2025-07-23 VITALS
TEMPERATURE: 98.2 F | BODY MASS INDEX: 22.7 KG/M2 | HEART RATE: 60 BPM | DIASTOLIC BLOOD PRESSURE: 58 MMHG | OXYGEN SATURATION: 99 % | WEIGHT: 167.6 LBS | HEIGHT: 72 IN | SYSTOLIC BLOOD PRESSURE: 116 MMHG | RESPIRATION RATE: 14 BRPM

## 2025-07-23 DIAGNOSIS — Z12.11 ENCOUNTER FOR SCREENING COLONOSCOPY: ICD-10-CM

## 2025-07-23 PROCEDURE — 45390 COLONOSCOPY W/RESECTION: CPT | Performed by: STUDENT IN AN ORGANIZED HEALTH CARE EDUCATION/TRAINING PROGRAM

## 2025-07-23 PROCEDURE — 45385 COLONOSCOPY W/LESION REMOVAL: CPT | Performed by: STUDENT IN AN ORGANIZED HEALTH CARE EDUCATION/TRAINING PROGRAM

## 2025-07-23 PROCEDURE — 88305 TISSUE EXAM BY PATHOLOGIST: CPT | Performed by: STUDENT IN AN ORGANIZED HEALTH CARE EDUCATION/TRAINING PROGRAM

## 2025-07-23 PROCEDURE — 25010000002 PROPOFOL 10 MG/ML EMULSION: Performed by: NURSE ANESTHETIST, CERTIFIED REGISTERED

## 2025-07-23 PROCEDURE — 25010000002 LIDOCAINE PF 2% 2 % SOLUTION: Performed by: NURSE ANESTHETIST, CERTIFIED REGISTERED

## 2025-07-23 PROCEDURE — 45380 COLONOSCOPY AND BIOPSY: CPT | Performed by: STUDENT IN AN ORGANIZED HEALTH CARE EDUCATION/TRAINING PROGRAM

## 2025-07-23 PROCEDURE — 25010000002 GLYCOPYRROLATE 0.2 MG/ML SOLUTION: Performed by: NURSE ANESTHETIST, CERTIFIED REGISTERED

## 2025-07-23 PROCEDURE — 25810000003 SODIUM CHLORIDE 0.9 % SOLUTION: Performed by: ANESTHESIOLOGY

## 2025-07-23 DEVICE — DEV CLIP HEMO RESOLUTION360/ULTR 235CM 17MM STRL: Type: IMPLANTABLE DEVICE | Site: SIGMOID COLON | Status: FUNCTIONAL

## 2025-07-23 RX ORDER — PROPOFOL 10 MG/ML
VIAL (ML) INTRAVENOUS AS NEEDED
Status: DISCONTINUED | OUTPATIENT
Start: 2025-07-23 | End: 2025-07-23 | Stop reason: SURG

## 2025-07-23 RX ORDER — DIPHENHYDRAMINE HYDROCHLORIDE 50 MG/ML
12.5 INJECTION, SOLUTION INTRAMUSCULAR; INTRAVENOUS
Status: DISCONTINUED | OUTPATIENT
Start: 2025-07-23 | End: 2025-07-23 | Stop reason: HOSPADM

## 2025-07-23 RX ORDER — SODIUM CHLORIDE 9 MG/ML
9 INJECTION, SOLUTION INTRAVENOUS CONTINUOUS PRN
Status: DISCONTINUED | OUTPATIENT
Start: 2025-07-23 | End: 2025-07-23 | Stop reason: HOSPADM

## 2025-07-23 RX ORDER — ONDANSETRON 2 MG/ML
4 INJECTION INTRAMUSCULAR; INTRAVENOUS ONCE AS NEEDED
Status: DISCONTINUED | OUTPATIENT
Start: 2025-07-23 | End: 2025-07-23 | Stop reason: HOSPADM

## 2025-07-23 RX ORDER — HYDRALAZINE HYDROCHLORIDE 20 MG/ML
5 INJECTION INTRAMUSCULAR; INTRAVENOUS
Status: DISCONTINUED | OUTPATIENT
Start: 2025-07-23 | End: 2025-07-23 | Stop reason: HOSPADM

## 2025-07-23 RX ORDER — LABETALOL HYDROCHLORIDE 5 MG/ML
5 INJECTION, SOLUTION INTRAVENOUS
Status: DISCONTINUED | OUTPATIENT
Start: 2025-07-23 | End: 2025-07-23 | Stop reason: HOSPADM

## 2025-07-23 RX ORDER — SODIUM CHLORIDE 9 MG/ML
30 INJECTION, SOLUTION INTRAVENOUS CONTINUOUS PRN
Status: DISCONTINUED | OUTPATIENT
Start: 2025-07-23 | End: 2025-07-23 | Stop reason: HOSPADM

## 2025-07-23 RX ORDER — MEPERIDINE HYDROCHLORIDE 25 MG/ML
12.5 INJECTION INTRAMUSCULAR; INTRAVENOUS; SUBCUTANEOUS
Status: DISCONTINUED | OUTPATIENT
Start: 2025-07-23 | End: 2025-07-23 | Stop reason: HOSPADM

## 2025-07-23 RX ORDER — EPHEDRINE SULFATE 5 MG/ML
5 INJECTION INTRAVENOUS ONCE AS NEEDED
Status: DISCONTINUED | OUTPATIENT
Start: 2025-07-23 | End: 2025-07-23 | Stop reason: HOSPADM

## 2025-07-23 RX ORDER — GLYCOPYRROLATE 0.2 MG/ML
INJECTION INTRAMUSCULAR; INTRAVENOUS AS NEEDED
Status: DISCONTINUED | OUTPATIENT
Start: 2025-07-23 | End: 2025-07-23 | Stop reason: SURG

## 2025-07-23 RX ORDER — SODIUM CHLORIDE 0.9 % (FLUSH) 0.9 %
10 SYRINGE (ML) INJECTION EVERY 12 HOURS SCHEDULED
Status: DISCONTINUED | OUTPATIENT
Start: 2025-07-23 | End: 2025-07-23 | Stop reason: HOSPADM

## 2025-07-23 RX ORDER — IPRATROPIUM BROMIDE AND ALBUTEROL SULFATE 2.5; .5 MG/3ML; MG/3ML
3 SOLUTION RESPIRATORY (INHALATION) ONCE AS NEEDED
Status: DISCONTINUED | OUTPATIENT
Start: 2025-07-23 | End: 2025-07-23 | Stop reason: HOSPADM

## 2025-07-23 RX ORDER — SODIUM CHLORIDE 0.9 % (FLUSH) 0.9 %
10 SYRINGE (ML) INJECTION AS NEEDED
Status: DISCONTINUED | OUTPATIENT
Start: 2025-07-23 | End: 2025-07-23 | Stop reason: HOSPADM

## 2025-07-23 RX ORDER — LIDOCAINE HYDROCHLORIDE 20 MG/ML
INJECTION, SOLUTION EPIDURAL; INFILTRATION; INTRACAUDAL; PERINEURAL AS NEEDED
Status: DISCONTINUED | OUTPATIENT
Start: 2025-07-23 | End: 2025-07-23 | Stop reason: SURG

## 2025-07-23 RX ADMIN — LIDOCAINE HYDROCHLORIDE 50 MG: 20 INJECTION, SOLUTION EPIDURAL; INFILTRATION; INTRACAUDAL; PERINEURAL at 09:54

## 2025-07-23 RX ADMIN — PROPOFOL 100 MG: 10 INJECTION, EMULSION INTRAVENOUS at 09:54

## 2025-07-23 RX ADMIN — GLYCOPYRROLATE 0.2 MG: 0.2 INJECTION, SOLUTION INTRAMUSCULAR; INTRAVENOUS at 09:54

## 2025-07-23 RX ADMIN — SODIUM CHLORIDE 9 ML/HR: 9 INJECTION, SOLUTION INTRAVENOUS at 09:28

## 2025-07-23 RX ADMIN — PROPOFOL 125 MCG/KG/MIN: 10 INJECTION, EMULSION INTRAVENOUS at 09:56

## 2025-07-23 NOTE — OP NOTE
[facility] [place] Colorectal Surgery  Colonoscopy Examination     Name: Hamlet Ortiz  : 1955  Date of Colonoscopy: 2025  Procedure: Average Risk Screening Colonoscopy  Surgeon: Nathan Navarro MD   Anesthesia: Sedation   IV Fluids: refer to anesthesia record    Procedure Details:    Prior to the procedure, history and physical exam was performed. Patient's medication and allergies were reviewed. The risk and benefits of the procedure and sedation options and risks were discussed with the patient. All questions were answered and informed consent was obtained.    The patient was brought to the endoscopy suite and placed in the left lateral decubitus position. Conscious sedation was administered by the anesthesia team. Vital signs including blood pressure, heart rate, and oxygen saturation were monitored continuously throughout the procedure.    The colonoscope was introduced through the rectum and advanced through the entire colon under direct visualization. The scope was maneuvered carefully, and the mucosa of the rectum, sigmoid colon, descending colon, transverse colon, ascending colon, and cecum were thoroughly inspected. Adequate insufflation was maintained throughout the procedure for optimal visualization.    Findings:    Rectum: enlarged and prolapsing internal hemorrhoids   Sigmoid colon: Multiple diverticulosis with medium and large opening.  20 mm likely advanced adenoma in the distal sigmoid colon was removed with EMR after injecting ascendo in the submucosa. The lesion was excised with hot snare. Ultra clip was placed over polypectomy site. Tattoo was placed just distal to polypectomy site for future evaluation. Additional 7 mm polyp removed with cold biopsy forceps.                                                    Descending colon: Normal appearance with no lesions,polyps, or abnormalities.  Transverse colon: Normal appearance with no lesions,polyps, or abnormalities.  Ascending colon: Normal  appearance with no lesions,polyps, or abnormalities.  Cecum: Normal appearance with no lesions,polyps, or abnormalities.    Procedure Images:                Interventions:  Sigmoid colon ( 20 mm, sessile, nodular) EMR, Hot snare, endo clip. Distal tattoo   2. Sigmoid colon (7 mm, sessile) cold biopsy forceps.   Specimens:  The removed polyps were retrieved and sent for histopathological examination to the pathology department for further analysis.    Recommendation:     Follow up pathology results   Repeat colonoscopy in 1-3 years     Conclusion:  The screening colonoscopy was completed successfully, and the entire colon was visualized without any complications. The patient tolerated the procedure well and was transferred to the recovery area in stable condition. Post-procedure instructions and follow-up were discussed with the patient and will be provided in written form.    Nathan Navarro MD  Colon and Rectal Surgery   [address], [address]  T: [phone]

## 2025-07-23 NOTE — H&P
Colorectal Surgery Preop Note    ID:  Hamlet Ortiz;     Chief Complaint  Screening colonoscopy     History of Present Illness  Hamlet Ortiz is a 70 y.o. male who is here for Screening colonoscopy      Past Medical History  Past Medical History:   Diagnosis Date    Aneurysm of ascending aorta     Cancer 11-    Squamous Cell carcinoma    Coronary artery disease     Hyperlipidemia     Hypertension     Visual impairment 2017    Macular degeneration     For further details please see prior notes and Health History Questionnaire scanned in Epic.  Past Surgical History  Past Surgical History:   Procedure Laterality Date    CARDIAC CATHETERIZATION N/A 2024    Procedure: Left Heart Cath;  Surgeon: Prince Oneil MD;  Location: [facility] CATH INVASIVE LOCATION;  Service: Cardiovascular;  Laterality: N/A;    HERNIA REPAIR      left inguinal groove    LYMPH NODE BIOPSY  2022    sqamous cell cancer of mouth excised    MULTIPLE TOOTH EXTRACTIONS  2022    all teeth removed    SHOULDER SURGERY Left     arthroscopic done     For further details please see prior notes and Health History Questionnaire scanned in Epic.  Family History  Family History   Problem Relation Age of Onset    Heart failure Mother     Arthritis Mother     Heart disease Mother     Kidney cancer Father     Alcohol abuse Father     Hyperlipidemia Brother     Stroke Maternal Grandmother     Lung cancer Paternal Grandfather     Heart valve disorder Daughter         drug abuse     For further details please see prior notes and Health History Questionnaire scanned in Epic.  Social History  Social History     Tobacco Use    Smoking status: Former     Current packs/day: 0.00     Average packs/day: 1 pack/day for 30.9 years (30.9 ttl pk-yrs)     Types: Cigarettes     Start date: 1992     Quit date: 2022     Years since quittin.6     Passive exposure: Past    Smokeless tobacco: Former     Types: Chew     Quit date:  1980   Vaping Use    Vaping status: Never Used   Substance Use Topics    Alcohol use: Not Currently     Comment: recovering alcoholic. None for about 18 years (11/2022)    Drug use: Never     For further details please see prior notes and Health History Questionnaire scanned in Epic.  Medication List    Current Facility-Administered Medications:     atropine sulfate (0.1 mg/mL) Intravenous 0.5 mg / 5 mL, 0.5 mg, Intravenous, Once PRN, Kandi Hathaway CRNA    diphenhydrAMINE (BENADRYL) injection 12.5 mg, 12.5 mg, Intravenous, Q15 Min PRN, Kandi Hathaway CRNA    ePHEDrine Sulfate (Pressors) 5 MG/ML injection 5 mg, 5 mg, Intravenous, Once PRN, Kandi Hathaway CRNA    hydrALAZINE (APRESOLINE) injection 5 mg, 5 mg, Intravenous, Q10 Min PRN, Kandi Hathaway CRNA    ipratropium-albuterol (DUO-NEB) nebulizer solution 3 mL, 3 mL, Nebulization, Once PRN, Kandi Hathaway CRNA    labetalol (NORMODYNE,TRANDATE) injection 5 mg, 5 mg, Intravenous, Q5 Min PRN, Kandi Hathaway CRNA    lidocaine (cardiac) (XYLOCAINE) injection 100 mg, 100 mg, Intravenous, Q5 Min PRN, Kandi Hathaway CRNA    meperidine (DEMEROL) injection 12.5 mg, 12.5 mg, Intravenous, Q5 Min PRN, Kandi Hathaway CRNA    ondansetron (ZOFRAN) injection 4 mg, 4 mg, Intravenous, Once PRN, Kandi Hathaway CRNA    sodium chloride 0.9 % flush 10 mL, 10 mL, Intravenous, Q12H, Ang Mercado MD    sodium chloride 0.9 % flush 10 mL, 10 mL, Intravenous, PRN, Ang Mercado MD    sodium chloride 0.9 % infusion, 9 mL/hr, Intravenous, Continuous PRN, Ang Mercado MD, Last Rate: 9 mL/hr at 07/23/25 0928, 9 mL/hr at 07/23/25 0928    sodium chloride 0.9 % infusion, 30 mL/hr, Intravenous, Continuous PRN, Анна Fry PA-C  For further details please see prior notes and Health History Questionnaire scanned in Epic.  Allergies  No Known Allergies  Review of Systems  Pertinent positives noted in HPI.    Physical Exam  General:  No acute distress  Head:  Normocephalic, atraumatic  CV: Regular rate, no edema, extremities warm and well perfused  Pulm: Symmetric chest rise, non-labored breathing  Neuro: Alert and oriented     Abdomen: Please see clinic note  Anorectal: Please see clinic note    Assessment  -Screening colonoscopy       Plan / Recommendations    1. Proceed to endo for screening colonoscopy       Nathan Navarro MD  Colon and Rectal Surgery   Glenda Schwartz

## 2025-07-23 NOTE — DISCHARGE INSTRUCTIONS
A responsible adult should stay with you and you should rest quietly for the rest of the day.    Do not drink alcohol, drive, operate any heavy machinery or power tools or make any legal/important decisions for the next 24 hours.     If you begin to experience severe pain, increased shortness of breath, racing heartbeat or a fever above 101 F, seek immediate medical attention.     Follow up with MD as instructed. Call office for results in 3 to 5 days if needed.     Dr Navarro Office at [phone]      Findings:     Rectum: enlarged and prolapsing internal hemorrhoids   Sigmoid colon: Multiple diverticulosis with medium and large opening.  20 mm likely advanced adenoma in the distal sigmoid colon was removed with EMR after injecting ascendo in the submucosa. The lesion was excised with hot snare. Ultra clip was placed over polypectomy site. Tattoo was placed just distal to polypectomy site for future evaluation. Additional 7 mm polyp removed with cold biopsy forceps.                                                    Descending colon: Normal appearance with no lesions,polyps, or abnormalities.  Transverse colon: Normal appearance with no lesions,polyps, or abnormalities.  Ascending colon: Normal appearance with no lesions,polyps, or abnormalities.  Cecum: Normal appearance with no lesions,polyps, or abnormalities.     Procedure Images:                    Interventions:  Sigmoid colon ( 20 mm, sessile, nodular) EMR, Hot snare, endo clip. Distal tattoo   2. Sigmoid colon (7 mm, sessile) cold biopsy forceps.   Specimens:  The removed polyps were retrieved and sent for histopathological examination to the pathology department for further analysis.     Recommendation:      Follow up pathology results   Repeat colonoscopy in 1-3 years      Conclusion:  The screening colonoscopy was completed successfully, and the entire colon was visualized without any complications. The patient tolerated the procedure well and was  transferred to the recovery area in stable condition. Post-procedure instructions and follow-up were discussed with the patient and will be provided in written form.

## 2025-07-23 NOTE — ANESTHESIA POSTPROCEDURE EVALUATION
Patient: Hamlet Ortiz    Procedure Summary       Date: 07/23/25 Room / Location: [facility] ENDOSCOPY 4 / [facility] ENDOSCOPY    Anesthesia Start: 0949 Anesthesia Stop: 1053    Procedure: COLONOSCOPYwith cold forcep polypectomy, hot snare polypectomy with endoscopic mucosal resection, spot tattooing and endoscopic clipping of post endoscopic mucosal resection site (Rectum) Diagnosis:       Encounter for screening colonoscopy      (Encounter for screening colonoscopy [Z12.11])    Surgeons: Nathan Navarro MD Provider: Ang Mercado MD    Anesthesia Type: general ASA Status: 3            Anesthesia Type: general    Vitals  Vitals Value Taken Time   /58 07/23/25 11:20   Temp     Pulse 57 07/23/25 11:22   Resp 14 07/23/25 11:20   SpO2 98 % 07/23/25 11:22   Vitals shown include unfiled device data.        Post Anesthesia Care and Evaluation    Patient location during evaluation: PACU  Patient participation: complete - patient participated  Level of consciousness: awake  Pain scale: See nurse's notes for pain score.  Pain management: adequate    Airway patency: patent  Anesthetic complications: No anesthetic complications  PONV Status: none  Cardiovascular status: acceptable  Respiratory status: acceptable and spontaneous ventilation  Hydration status: acceptable    Comments: Patient seen and examined postoperatively; vital signs stable; SpO2 greater than or equal to 90%; cardiopulmonary status stable; nausea/vomiting adequately controlled; pain adequately controlled; no apparent anesthesia complications; patient discharged from anesthesia care when discharge criteria were met

## 2025-07-24 LAB
LAB AP CASE REPORT: NORMAL
PATH REPORT.FINAL DX SPEC: NORMAL
PATH REPORT.GROSS SPEC: NORMAL

## 2025-07-28 RX ORDER — LABETALOL 100 MG/1
100 TABLET, FILM COATED ORAL 2 TIMES DAILY
Qty: 180 TABLET | Refills: 0 | Status: SHIPPED | OUTPATIENT
Start: 2025-07-28

## 2025-07-28 RX ORDER — ROSUVASTATIN CALCIUM 40 MG/1
40 TABLET, COATED ORAL DAILY
Qty: 90 TABLET | Refills: 3 | Status: SHIPPED | OUTPATIENT
Start: 2025-07-28

## 2025-07-28 NOTE — TELEPHONE ENCOUNTER
"    Caller: Hamlet Ortiz \"Eriberto\"    Relationship: Self    Best call back number:[phone]    Requested Prescriptions:   Requested Prescriptions     Pending Prescriptions Disp Refills    rosuvastatin (CRESTOR) 40 MG tablet 90 tablet 3     Sig: Take 1 tablet by mouth Daily.        Pharmacy where request should be sent: [id number] - [place], [place] [address] - [phone]  - [phone] FX     Last office visit with prescribing clinician: 5/16/2025   Last telemedicine visit with prescribing clinician: Visit date not found   Next office visit with prescribing clinician: 5/15/2026         Miroslava Sandoval Rep   07/28/25 09:40 EDT         "

## 2025-08-07 ENCOUNTER — TELEPHONE (OUTPATIENT)
Dept: FAMILY MEDICINE CLINIC | Facility: CLINIC | Age: 70
End: 2025-08-07
Payer: COMMERCIAL

## 2025-08-08 ENCOUNTER — TELEPHONE (OUTPATIENT)
Dept: FAMILY MEDICINE CLINIC | Facility: CLINIC | Age: 70
End: 2025-08-08
Payer: COMMERCIAL

## 2025-08-08 DIAGNOSIS — Z87.891 PERSONAL HISTORY OF NICOTINE DEPENDENCE: Primary | ICD-10-CM

## (undated) DEVICE — THE CARR-LOCKE INJECTION NEEDLE IS A SINGLE USE, DISPOSABLE, FLEXIBLE SHEATH INJECTION NEEDLE USED FOR THE INJECTION OF VARIOUS TYPES OF MEDIA THROUGH FLEXIBLE ENDOSCOPES.

## (undated) DEVICE — AGNT LIFT ENDO ASCENDO SUBMUCOSAL PREFIL/SYR 5ML 1P/U STRL

## (undated) DEVICE — PK ENDO GI 50

## (undated) DEVICE — PK TRY HEART CATH 50

## (undated) DEVICE — CATH DIAG IMPULSE FL4 6F 100CM

## (undated) DEVICE — DGW .035 FC J3MM 150CM TEF HEP: Brand: EMERALD

## (undated) DEVICE — CATH DIAG IMPULSE FL5 6F 100CM

## (undated) DEVICE — PINNACLE INTRODUCER SHEATH: Brand: PINNACLE

## (undated) DEVICE — CATH DIAG IMPULSE FR4 6F 100CM

## (undated) DEVICE — SINGLE-USE BIOPSY FORCEPS: Brand: RADIAL JAW 4

## (undated) DEVICE — Device: Brand: BLACK EYE

## (undated) DEVICE — SNAR POLYP HOTSNARE/BRAIDED OVL/MINI 7F 2.8X10MM 230CM 1P/U

## (undated) DEVICE — MYNXGRIP 6F/7F: Brand: MYNXGRIP

## (undated) DEVICE — ELECTRD DEFIB M/FUNC PROPADZ RADIOL 2PK

## (undated) DEVICE — PAD, GROUNDING, UNIVERSAL, SPLIT, 9': Brand: MEDLINE

## (undated) DEVICE — QUICK CATCH IN-LINE SUCTION POLYP TRAP IS USED FOR SUCTION RETRIEVAL OF ENDOSCOPICALLY REMOVED POLYPS.